# Patient Record
Sex: MALE | Race: WHITE | Employment: OTHER | URBAN - METROPOLITAN AREA
[De-identification: names, ages, dates, MRNs, and addresses within clinical notes are randomized per-mention and may not be internally consistent; named-entity substitution may affect disease eponyms.]

---

## 2020-12-09 ENCOUNTER — HOSPITAL ENCOUNTER (OUTPATIENT)
Dept: PHYSICAL THERAPY | Age: 45
Discharge: HOME | End: 2020-12-09
Payer: OTHER GOVERNMENT

## 2020-12-09 PROCEDURE — 97162 PT EVAL MOD COMPLEX 30 MIN: CPT

## 2020-12-09 PROCEDURE — 97110 THERAPEUTIC EXERCISES: CPT

## 2020-12-09 NOTE — PROGRESS NOTES
[facility] [facility] PHYSICAL THERAPY  [facility]  [address], [address], [address]  Phone: [phone]  Fax: [phone] / STATEMENT OF MEDICAL NECESSITY FOR PHYSICAL THERAPY SERVICES  Patient Name: Torey Vasquez : 1975   Medical   Diagnosis: Neck pain Treatment Diagnosis: Neck pain [M54.2]   Onset Date: 2020     Referral Source: Charlene Farfan DO Start of Care [facility]): 2020   Prior Hospitalization: See medical history Provider #: [id number]   Prior Level of Function: , crossfit, weighted exercise   Comorbidities: arthritis   Medications: Verified on Patient Summary List   The Plan of Care and following information is based on the information from the initial evaluation.   ===========================================================================================  Assessment / key information:  Patient is a 54 y.o. male who presents to [facility] with complaints of L sided neck pain with associated L shoulder pain and occasional tingling in fingers, denies HA. Patient was a [employer]  for 20+ years and reports heavy helmet aided in production of poor posture. Patient reports onset of neck stiffness a year ago, and that pain has gotten worse in the past few months. Patient has long history of L shoulder pain for over 7 years, recently received a cortisone shot and stated that it helped reduce pain and numbness in hand. Patient reports neck pain is exacerbated with sitting long periods, poor sleep and driving, and relieved with a good night of sleep, and tylenol. Pt reports 3-4/10 pain currently, 2-3/10 pain at rest and 5/10 pain at worst. Patient presents with increased tone to B UT L>R , FHRS, L shoulder elevation, decreased mobility of BL 1st rib L>R and hypomobile cervical PIVM.     Patient presents with the following ROM and MMT: thoracic ROM 50% limited, cervical AROM: R rot 45 degrees, L rot 50 degrees p!, B SB 35 degrees. Deep neck flexor endurance 12 secs before chin lift. BL upper extremity strength 5-/5 with no exacerbations in neck pain. Rhomboids 3+/5, mid traps 3+/5. Physical Therapy services will be beneficial in order to decrease pain and tone, improve deep cervical flexor and scapular stabilizer strength, and improve cervicothoracic ROM to improve resting posture.   ===========================================================================================  Eval Complexity: History MEDIUM  Complexity : 1-2 comorbidities / personal factors will impact the outcome/ POC ;  Examination  MEDIUM Complexity : 3 Standardized tests and measures addressing body structure, function, activity limitation and / or participation in recreation ; Presentation MEDIUM Complexity : Evolving with changing characteristics ; Decision Making MEDIUM Complexity : FOTO score of 26-74; Overall Complexity MEDIUM  Problem List: pain affecting function, decrease ROM, decrease strength, edema affecting function, impaired gait/ balance and decrease ADL/ functional abilitiies   Treatment Plan may include any combination of the following: Therapeutic exercise, Therapeutic activities, Neuromuscular re-education, Physical agent/modality, Gait/balance training, Manual therapy, Patient education, Self Care training and Functional mobility training  Patient / Family readiness to learn indicated by: asking questions, trying to perform skills and interest  Persons(s) to be included in education: patient (P)  Barriers to Learning/Limitations: None  Measures taken, if barriers to learning:    Patient Goal (s): \"Get range of motion back and prevent further degrade\"   Patient self reported health status: excellent  Rehabilitation Potential: good   Short Term Goals: To be accomplished in  3  weeks:  1. Patient will be independent and compliant with initial HEP.   2. Patient will report decreased pain levels to 2/10 in order to sleep through the night pain free. 3. Improve BL SB ROM to 45 degrees in order to demonstrate decreased tone in Upper traps and decrease reports of stiffness. 4. Demonstrate improved scapular stabilizer strength to 4/5 to improve patients ability to maintain improved posture for prolonged periods.  Long Term Goals: To be accomplished in  6  weeks:  1. Patient will report decreased pain levels to 0/10 in order to resume work as a  without exacerbations in pain. 2. Improve FOTO score from 40 to > or = 63 in order to improve ability to perform ADLs without increased pain. 3. Demonstrate improved scapular stabilizer strength to 4+/5 to improve patients ability to return to weighted exercise program.  4. Demonstrate 75%  Improvement cervicothoracic ROM in order to maintain improved posture for prolonged periods. Frequency / Duration:   Patient to be seen  2  times per week for 6  weeks:  Patient / Caregiver education and instruction: activity modification and exercises  Therapist Signature: Chapito Humphrey DPT Date: 91/8/6543   Certification Period: NA Time: 2:03 PM   ===========================================================================================  I certify that the above Physical Therapy Services are being furnished while the patient is under my care. I agree with the treatment plan and certify that this therapy is necessary. Physician Signature:        Date:       Time:     Please sign and return to [facility] or you may fax the signed copy to [phone]. Thank you.

## 2020-12-10 NOTE — PROGRESS NOTES
PHYSICAL THERAPY - DAILY TREATMENT NOTE    Patient Name: Brando Horowitz        Date: 2020  : 1975   YES Patient  Verified  Visit #:     Insurance: Payor: CHRIS / Plan: Jer Melara 73 / Product Type:  /      In time: 200p Out time: 250p   Total Treatment Time: 50     BCBS/Medicare Time Tracking (below)   Total Timed Codes (min):  NA 1:1 Treatment Time:  NA     TREATMENT AREA =  Neck pain [M54.2]    SUBJECTIVE  Pain Level (on 0 to 10 scale):  3-4  / 10   Medication Changes/New allergies or changes in medical history, any new surgeries or procedures? NO    If yes, update Summary List   Subjective Functional Status/Changes:  []  No changes reported     Approximately 3 mo of L sided Neck pain and stiffness           Modalities Rationale:     decrease edema, decrease inflammation, decrease pain and increase tissue extensibility to improve patient's ability to perform pain-free ADLs. min [] Estim, type/location:                                      []  att     []  unatt     []  w/US     []  w/ice    []  w/heat    min []  Mechanical Traction: type/lbs                   []  pro   []  sup   []  int   []  cont    []  before manual    []  after manual    min []  Ultrasound, settings/location:      min []  Iontophoresis w/ dexamethasone, location:                                               []  take home patch       []  in clinic   10 min []  Ice     [x]  Heat    location/position:  To cervical spine in seated    min []  Vasopneumatic Device, press/temp:     min []  Other:    [x] Skin assessment post-treatment (if applicable):    [x]  intact    [x]  redness- no adverse reaction     []redness  adverse reaction:        15 min Therapeutic Exercise:  [x]  See flow sheet   Rationale:      increase ROM, increase strength and improve coordination to improve the patients ability to maintain improved posture       Billed With/As:   [x] TE   [] TA   [] Neuro   [] Self Care Patient Education: [x] Review HEP    [] Progressed/Changed HEP based on:   [] positioning   [] body mechanics   [] transfers   [] heat/ice application    [] other:      Other Objective/Functional Measures:    See POC for objective measures     Post Treatment Pain Level (on 0 to 10) scale:   2  / 10     ASSESSMENT  Assessment/Changes in Function:     See POC      [x]  See Progress Note/Recertification   Patient will continue to benefit from skilled PT services to modify and progress therapeutic interventions, address functional mobility deficits, address ROM deficits, address strength deficits, analyze and address soft tissue restrictions, analyze and cue movement patterns and analyze and modify body mechanics/ergonomics to attain remaining goals. Progress toward goals / Updated goals:    See POC for new short and long term goals.       PLAN  [x]  Upgrade activities as tolerated YES Continue plan of care   []  Discharge due to :    []  Other:      Therapist: Lindsey Ruiz DPT    Date: 12/9/2020 Time: 7:20 PM     Future Appointments   Date Time Provider Shirlene Gómez   12/16/2020  8:45 AM Lyudmila Mayer [facility]   12/18/2020  9:00 AM Bruce Sandoval, PT [facility]   12/22/2020  1:15 PM Bruce Sandoval, PT [facility]   12/24/2020 12:30 PM Bruce Sandoval PT [facility]   12/28/2020  9:45 AM Darío Bai PT [facility]   12/30/2020  5:00 PM Bruce Sandoval, PT [facility]   1/4/2021 11:45 AM Bruce Sandoval PT [facility]

## 2020-12-16 ENCOUNTER — HOSPITAL ENCOUNTER (OUTPATIENT)
Dept: PHYSICAL THERAPY | Age: 45
Discharge: HOME | End: 2020-12-16
Payer: OTHER GOVERNMENT

## 2020-12-16 PROCEDURE — 97110 THERAPEUTIC EXERCISES: CPT

## 2020-12-16 PROCEDURE — 97140 MANUAL THERAPY 1/> REGIONS: CPT

## 2020-12-16 NOTE — PROGRESS NOTES
PHYSICAL THERAPY - DAILY TREATMENT NOTE    Patient Name: Anette Tellez        Date: 2020  : 1975   YES Patient  Verified  Visit #:   2   of   12  Insurance: Payor: CHRIS / Plan: Jer Melara 73 / Product Type:  /      In time: 845 Out time: 945   Total Treatment Time: 60     BCBS/Medicare Time Tracking (below)   Total Timed Codes (min):  NA 1:1 Treatment Time:  NA     TREATMENT AREA =  Neck pain [M54.2]    SUBJECTIVE  Pain Level (on 0 to 10 scale):  2  / 10   Medication Changes/New allergies or changes in medical history, any new surgeries or procedures? NO    If yes, update Summary List   Subjective Functional Status/Changes:  []  No changes reported     My neck bothers me most at night in the evening. Modalities Rationale:     decrease pain to improve patient's ability to perform pain free ADLs1   min [] Estim, type/location:                                      []  att     []  unatt     []  w/US     []  w/ice    []  w/heat    min []  Mechanical Traction: type/lbs                   []  pro   []  sup   []  int   []  cont    []  before manual    []  after manual    min []  Ultrasound, settings/location:      min []  Iontophoresis w/ dexamethasone, location:                                               []  take home patch       []  in clinic   10 min []  Ice     [x]  Heat    location/position: MHP to c/s in supine     min []  Vasopneumatic Device, press/temp:     min []  Other:    [x] Skin assessment post-treatment (if applicable):    [x]  intact    [x]  redness- no adverse reaction     []redness  adverse reaction:        40 min Therapeutic Exercise:  [x]  See flow sheet   Rationale:      increase ROM, increase strength, improve coordination, improve balance and increase proprioception to improve the patients ability to perform unlimited ADLs      10 min Manual Therapy: Manual UT stretch, STM and DTM to L UT, L c/s paraspinals and SOR.    Rationale:      decrease pain, increase ROM and increase tissue extensibility to improve patient's ability to perform pain free ADLs   The manual therapy interventions were performed at a separate and distinct time from the therapeutic activities interventions. Billed With/As:   [] TE   [] TA   [] Neuro   [] Self Care Patient Education: [x] Review HEP    [] Progressed/Changed HEP based on:   [] positioning   [] body mechanics   [] transfers   [] heat/ice application    [] other:      Other Objective/Functional Measures:    Initiated exercises per flow sheet      Post Treatment Pain Level (on 0 to 10) scale:   0  / 10     ASSESSMENT  Assessment/Changes in Function:     Quick to fatigue with all parascapular muscular strengthening exercises. []  See Progress Note/Recertification   Patient will continue to benefit from skilled PT services to modify and progress therapeutic interventions, address functional mobility deficits, address ROM deficits, address strength deficits, analyze and address soft tissue restrictions, analyze and cue movement patterns, analyze and modify body mechanics/ergonomics, assess and modify postural abnormalities, address imbalance/dizziness and instruct in home and community integration to attain remaining goals. Progress toward goals / Updated goals:    Progressing towards LTG 2.      PLAN  [x]  Upgrade activities as tolerated YES Continue plan of care   []  Discharge due to :    []  Other:      Therapist: Salima Jarvis    Date: 12/16/2020 Time: 11:14 AM     Future Appointments   Date Time Provider Shirlene Gómez   12/18/2020  9:00 AM Rita Betts PT [facility]   12/22/2020  1:15 PM Rita Betts PT [facility]   12/24/2020 12:30 PM Rita Betts PT [facility]   12/28/2020  9:45 AM IRMA Zelaya [facility]   12/30/2020  5:00 PM Rita Betts PT [facility]   1/4/2021 11:45 AM IRMA Jameson [facility]

## 2020-12-18 ENCOUNTER — HOSPITAL ENCOUNTER (OUTPATIENT)
Dept: PHYSICAL THERAPY | Age: 45
Discharge: HOME | End: 2020-12-18
Payer: OTHER GOVERNMENT

## 2020-12-18 PROCEDURE — 97140 MANUAL THERAPY 1/> REGIONS: CPT

## 2020-12-18 PROCEDURE — 97110 THERAPEUTIC EXERCISES: CPT

## 2020-12-18 NOTE — PROGRESS NOTES
PHYSICAL THERAPY - DAILY TREATMENT NOTE    Patient Name: Jess Bonilla        Date: 2020  : 1975   YES Patient  Verified  Visit #:   3   of   12  Insurance: Payor: CHRIS / Plan: Jer Melara 73 / Product Type:  /      In time: 900a Out time: 955a   Total Treatment Time: 55     BCBS/Medicare Time Tracking (below)   Total Timed Codes (min):  NA 1:1 Treatment Time:  NA     TREATMENT AREA =  Neck pain [M54.2]    SUBJECTIVE  Pain Level (on 0 to 10 scale):  0  / 10   Medication Changes/New allergies or changes in medical history, any new surgeries or procedures? NO    If yes, update Summary List   Subjective Functional Status/Changes:  []  No changes reported     I feel good today, im still stiff but no pain today. Modalities Rationale:     decrease edema, decrease inflammation and decrease pain to improve patient's ability to perform pain-free ADLs.     min [] Estim, type/location:                                      []  att     []  unatt     []  w/US     []  w/ice    []  w/heat    min []  Mechanical Traction: type/lbs                   []  pro   []  sup   []  int   []  cont    []  before manual    []  after manual    min []  Ultrasound, settings/location:      min []  Iontophoresis w/ dexamethasone, location:                                               []  take home patch       []  in clinic   10 min []  Ice     [x]  Heat    location/position: Cervical spine in supine    min []  Vasopneumatic Device, press/temp:     min []  Other:    [x] Skin assessment post-treatment (if applicable):    [x]  intact    [x]  redness- no adverse reaction     []redness  adverse reaction:        35 min Therapeutic Exercise:  [x]  See flow sheet   Rationale:      increase ROM, increase strength and improve coordination to improve the patients ability to improve resting posture     10 min Manual Therapy: Manual UT stretch, STM and DTM to L UT, L c/s paraspinals and SOR, Grade IV first rib mobilizations   Rationale:      decrease pain, increase ROM, increase tissue extensibility and decrease edema  to improve patient's ability to maintain  The manual therapy interventions were performed at a separate and distinct time from the therapeutic activities interventions. Billed With/As:   [x] TE   [] TA   [] Neuro   [] Self Care Patient Education: [x] Review HEP    [] Progressed/Changed HEP based on:   [] positioning   [] body mechanics   [] transfers   [] heat/ice application    [] other:      Other Objective/Functional Measures: Added thoracic extension in supine   Post Treatment Pain Level (on 0 to 10) scale:   0  / 10     ASSESSMENT  Assessment/Changes in Function:     Patient shows improvements with scapular control but continues to require manual cues to prevent UT engagement     []  See Progress Note/Recertification   Patient will continue to benefit from skilled PT services to modify and progress therapeutic interventions, address functional mobility deficits, address ROM deficits, address strength deficits, analyze and address soft tissue restrictions, analyze and cue movement patterns, analyze and modify body mechanics/ergonomics and assess and modify postural abnormalities to attain remaining goals. Progress toward goals / Updated goals:    Progressing towards STG #3.      PLAN  [x]  Upgrade activities as tolerated YES Continue plan of care   []  Discharge due to :    []  Other:      Therapist: Loida Willoughby DPT    Date: 12/18/2020 Time: 9:46 AM     Future Appointments   Date Time Provider Shirlene Gómez   12/22/2020  1:15 PM Toni Minor PT [facility]   12/24/2020 12:30 PM Toni Minor PT [facility]   12/28/2020  9:45 AM Marlene Corea PT [facility]   12/30/2020  5:00 PM Toni Minor PT [facility]   1/4/2021 11:45 AM IRMA Pritchard [facility]

## 2020-12-22 ENCOUNTER — HOSPITAL ENCOUNTER (OUTPATIENT)
Dept: PHYSICAL THERAPY | Age: 45
Discharge: HOME | End: 2020-12-22
Payer: OTHER GOVERNMENT

## 2020-12-22 PROCEDURE — 97014 ELECTRIC STIMULATION THERAPY: CPT

## 2020-12-22 PROCEDURE — 97140 MANUAL THERAPY 1/> REGIONS: CPT

## 2020-12-22 PROCEDURE — 97110 THERAPEUTIC EXERCISES: CPT

## 2020-12-22 NOTE — PROGRESS NOTES
PHYSICAL THERAPY - DAILY TREATMENT NOTE    Patient Name: Gorge King        Date: 2020  : 1975   YES Patient  Verified  Visit #:   4   of   12  Insurance: Payor: CHRIS / Plan: Jer Melara 73 / Product Type:  /      In time: 115p Out time: 215p   Total Treatment Time: 55     BCBS/Medicare Time Tracking (below)   Total Timed Codes (min):  NA 1:1 Treatment Time:  NA     TREATMENT AREA =  Neck pain [M54.2]    SUBJECTIVE  Pain Level (on 0 to 10 scale):  0  / 10   Medication Changes/New allergies or changes in medical history, any new surgeries or procedures? NO    If yes, update Summary List   Subjective Functional Status/Changes:  []  No changes reported     I was a little sore after last session but Im feeling better           Modalities Rationale:     decrease edema, decrease inflammation, decrease pain and increase tissue extensibility to improve patient's ability to perform pain-free ADLs.    15 min [x] Estim, type/location:                                      []  att     [x]  unatt     []  w/US     []  w/ice    [x]  w/heat    min []  Mechanical Traction: type/lbs                   []  pro   []  sup   []  int   []  cont    []  before manual    []  after manual    min []  Ultrasound, settings/location:      min []  Iontophoresis w/ dexamethasone, location:                                               []  take home patch       []  in clinic    min []  Ice     []  Heat    location/position:     min []  Vasopneumatic Device, press/temp:     min []  Other:    [] Skin assessment post-treatment (if applicable):    []  intact    []  redness- no adverse reaction     []redness  adverse reaction:        30 min Therapeutic Exercise:  [x]  See flow sheet   Rationale:      increase ROM, increase strength and improve coordination to improve the patients ability to maintain improved posture     10 min Manual Therapy: Manual UT stretch, STM and DTM to L UT, L c/s paraspinals and SOR, Grade IV first rib mobilizations   Rationale:      decrease pain, increase ROM, increase tissue extensibility and decrease edema  to improve patient's ability to resume exercise program without exacerbations in pain  The manual therapy interventions were performed at a separate and distinct time from the therapeutic activities interventions. Billed With/As:   [x] TE   [] TA   [] Neuro   [] Self Care Patient Education: [x] Review HEP    [] Progressed/Changed HEP based on:   [] positioning   [] body mechanics   [] transfers   [] heat/ice application    [] other:      Other Objective/Functional Measures: Added open/close book and banded extensions  Added IFC during heat for hypertension in L upper trap     Post Treatment Pain Level (on 0 to 10) scale:   0  / 10     ASSESSMENT  Assessment/Changes in Function:     Patient shows improved control of scapular stabilizers, and improvements in cervicothoracic ROM. Decreased tension in L UT during manual. Less recruitment of SCM during chin tuck and lift. []  See Progress Note/Recertification   Patient will continue to benefit from skilled PT services to modify and progress therapeutic interventions, address functional mobility deficits, address ROM deficits, address strength deficits, analyze and address soft tissue restrictions, analyze and cue movement patterns, analyze and modify body mechanics/ergonomics and assess and modify postural abnormalities to attain remaining goals.    Progress toward goals / Updated goals:    Progressing towards ROM goals     PLAN  [x]  Upgrade activities as tolerated YES Continue plan of care   []  Discharge due to :    []  Other:      Therapist: Max Gonzales DPT    Date: 12/22/2020 Time: 1:19 PM     Future Appointments   Date Time Provider Shirlene Gómez   12/24/2020 12:30 PM Reema Smalls PT [facility]   12/28/2020  9:45 AM IRMA Hand [facility]   12/30/2020  5:00 PM Reema Smalls PT [facility]   1/4/2021 11:45 AM Clyde Rodriguez, PT [facility]

## 2020-12-24 ENCOUNTER — HOSPITAL ENCOUNTER (OUTPATIENT)
Dept: PHYSICAL THERAPY | Age: 45
Discharge: HOME | End: 2020-12-24
Payer: OTHER GOVERNMENT

## 2020-12-24 PROCEDURE — 97110 THERAPEUTIC EXERCISES: CPT

## 2020-12-24 PROCEDURE — 97014 ELECTRIC STIMULATION THERAPY: CPT

## 2020-12-24 PROCEDURE — 97140 MANUAL THERAPY 1/> REGIONS: CPT

## 2020-12-24 NOTE — PROGRESS NOTES
PHYSICAL THERAPY - DAILY TREATMENT NOTE    Patient Name: Brando Horowitz        Date: 2020  : 1975   YES Patient  Verified  Visit #:     Insurance: Payor: CHRIS / Plan: Jer Melara 73 / Product Type:  /      In time: 1230p Out time: 130p   Total Treatment Time: 55     BCBS/Medicare Time Tracking (below)   Total Timed Codes (min):  55 1:1 Treatment Time:  55      TREATMENT AREA =  Neck pain [M54.2]    SUBJECTIVE  Pain Level (on 0 to 10 scale):  0  / 10   Medication Changes/New allergies or changes in medical history, any new surgeries or procedures? NO    If yes, update Summary List   Subjective Functional Status/Changes:  []  No changes reported     Main complaint is still stiffness on L side. It used to be uncomfortable all day but now I only notice it at night. Modalities Rationale:     decrease edema, decrease inflammation, decrease pain and increase tissue extensibility to improve patient's ability to perform pain-free ADLs. 15 min [x] Estim, type/location:  To cervical spine in supine                                     []  att     [x]  unatt     []  w/US     []  w/ice    [x]  w/heat    min []  Mechanical Traction: type/lbs                   []  pro   []  sup   []  int   []  cont    []  before manual    []  after manual    min []  Ultrasound, settings/location:      min []  Iontophoresis w/ dexamethasone, location:                                               []  take home patch       []  in clinic    min []  Ice     []  Heat    location/position:     min []  Vasopneumatic Device, press/temp:     min []  Other:    [x] Skin assessment post-treatment (if applicable):    [x]  intact    [x]  redness- no adverse reaction     []redness  adverse reaction:        30 min Therapeutic Exercise:  [x]  See flow sheet   Rationale:      increase ROM, increase strength and improve coordination to improve the patients ability to maintain improved posture     10 min Manual Therapy:    Rationale:      decrease pain, increase ROM, increase tissue extensibility and decrease edema  to improve patient's ability to resume reaching activities without exacerbations in neck pain. The manual therapy interventions were performed at a separate and distinct time from the therapeutic activities interventions. Billed With/As:   [x] TE   [] TA   [] Neuro   [] Self Care Patient Education: [x] Review HEP    [] Progressed/Changed HEP based on:   [] positioning   [] body mechanics   [] transfers   [] heat/ice application    [] other:      Other Objective/Functional Measures:    Thoracic extension over 1/2 FR, horizontal abduction in front of mirror for cues of UT engagement    Cervical AROM: R rot 65 degrees, L rot 70 degrees, R SB 15 degrees, L SB 25 degrees   Post Treatment Pain Level (on 0 to 10) scale:   0  / 10     ASSESSMENT  Assessment/Changes in Function:     Patient shows improvements in cervical and thoracic ROM. Visible increased tension in BL UT L>R, but patient is better able to prevent activation of UT during exercises. []  See Progress Note/Recertification   Patient will continue to benefit from skilled PT services to modify and progress therapeutic interventions, address functional mobility deficits, address ROM deficits, address strength deficits, analyze and address soft tissue restrictions, analyze and cue movement patterns, analyze and modify body mechanics/ergonomics and assess and modify postural abnormalities to attain remaining goals.    Progress toward goals / Updated goals:    Progressing towards ROM goals     PLAN  [x]  Upgrade activities as tolerated YES Continue plan of care   []  Discharge due to :    []  Other:      Therapist: Perla Flores DPT    Date: 12/24/2020 Time: 12:32 PM     Future Appointments   Date Time Provider Shirlene Gómez   12/28/2020  9:45 AM Fatmata Beach, PT [facility]   12/30/2020  5:00 PM Aleja Marte PT [facility] [place] [facility]   1/4/2021 11:45 AM Bright Bonilla, PT [facility]

## 2020-12-28 ENCOUNTER — HOSPITAL ENCOUNTER (OUTPATIENT)
Dept: PHYSICAL THERAPY | Age: 45
Discharge: HOME | End: 2020-12-28
Payer: OTHER GOVERNMENT

## 2020-12-28 PROCEDURE — 97140 MANUAL THERAPY 1/> REGIONS: CPT

## 2020-12-28 PROCEDURE — 97110 THERAPEUTIC EXERCISES: CPT

## 2020-12-28 PROCEDURE — 97014 ELECTRIC STIMULATION THERAPY: CPT

## 2020-12-28 NOTE — PROGRESS NOTES
PHYSICAL THERAPY - DAILY TREATMENT NOTE    Patient Name: Katerina Mclean        Date: 2020  : 1975   YES Patient  Verified  Visit #:     Insurance: Payor: CHRIS / Plan: Jer Melara 73 / Product Type:  /      In time: 950 Out time: 1100   Total Treatment Time: 65     BCBS/Medicare Time Tracking (below)   Total Timed Codes (min):   1:1 Treatment Time:       TREATMENT AREA =  Neck pain [M54.2]    SUBJECTIVE  Pain Level (on 0 to 10 scale):  1-2  / 10   Medication Changes/New allergies or changes in medical history, any new surgeries or procedures? NO    If yes, update Summary List   Subjective Functional Status/Changes:  []  No changes reported     I only have the tingling in my L hand when I'm walking the dog.  The neck pain has been better and I dont shift positions as much when Im sitting because I'm uncomfortable           Modalities Rationale:     decrease inflammation, decrease pain and increase tissue extensibility to improve patient's ability to perform ADLs  15 min [x] Estim, type/location: IFC to C/S in supine                                    []  att     [x]  unatt     []  w/US     []  w/ice    [x]  w/heat    min []  Mechanical Traction: type/lbs                   []  pro   []  sup   []  int   []  cont    []  before manual    []  after manual    min []  Ultrasound, settings/location:      min []  Iontophoresis w/ dexamethasone, location:                                               []  take home patch       []  in clinic    min []  Ice     []  Heat    location/position:     min []  Vasopneumatic Device, press/temp:     min []  Other:    [x] Skin assessment post-treatment (if applicable):    [x]  intact    [x]  redness- no adverse reaction     []redness  adverse reaction:        40 min Therapeutic Exercise:  [x]  See flow sheet   Rationale:      increase ROM and increase strength to improve the patients ability to perform unlimted ADLs     10 min Manual Therapy: Technique:      [] S/DTM []IASTM []PROM  [] Passive Stretching  []manual TPR  [x]Jt manipulation:Gr I [] II []  III [] IV[x] V[]  Treatment/Area:  L 1st rib depression with respiratory assist, mTPR to L UT and scalenes   Rationale:      decrease pain, increase ROM, increase tissue extensibility and decrease trigger points to improve patient's ability to perform ADLs  The manual therapy interventions were performed at a separate and distinct time from the therapeutic activities interventions. Billed With/As:   [] TE   [] TA   [] Neuro   [] Self Care Patient Education: [x] Review HEP    [] Progressed/Changed HEP based on:   [] positioning   [] body mechanics   [] transfers   [] heat/ice application    [] other:      Other Objective/Functional Measures:    TE per FS     Post Treatment Pain Level (on 0 to 10) scale:   0  / 10     ASSESSMENT  Assessment/Changes in Function:     Improved L 1st rib mobility after MT with respiratory A. Improved form during diaphragmatic breathing with repeated cueing     []  See Progress Note/Recertification   Patient will continue to benefit from skilled PT services to modify and progress therapeutic interventions, address functional mobility deficits, address ROM deficits, address strength deficits, analyze and address soft tissue restrictions, analyze and cue movement patterns, analyze and modify body mechanics/ergonomics, assess and modify postural abnormalities, address imbalance/dizziness and instruct in home and community integration to attain remaining goals.    Progress toward goals / Updated goals:    Progressing towards LTG1     PLAN  [x]  Upgrade activities as tolerated YES Continue plan of care   []  Discharge due to :    []  Other:      Therapist: Georgina Cantu, PT, DPT, MTC, CMTPT    Date: 12/28/2020 Time: 2:26 PM     Future Appointments   Date Time Provider Shirlene Gómez   12/30/2020  5:00 PM Tamar Reese, PT [facility]   1/4/2021 11:45 AM Tamar Reese PT [facility]

## 2020-12-30 ENCOUNTER — HOSPITAL ENCOUNTER (OUTPATIENT)
Dept: PHYSICAL THERAPY | Age: 45
Discharge: HOME | End: 2020-12-30
Payer: OTHER GOVERNMENT

## 2020-12-30 PROCEDURE — 97110 THERAPEUTIC EXERCISES: CPT

## 2020-12-30 PROCEDURE — 97140 MANUAL THERAPY 1/> REGIONS: CPT

## 2020-12-30 PROCEDURE — 97014 ELECTRIC STIMULATION THERAPY: CPT

## 2020-12-30 NOTE — PROGRESS NOTES
PHYSICAL THERAPY - DAILY TREATMENT NOTE    Patient Name: Izzy Mak        Date: 2020  : 1975   YES Patient  Verified  Visit #:     Insurance: Payor: CHRIS / Plan: Ruth Lopes / Product Type:  /      In time: 500p Out time: 555p   Total Treatment Time: 55     BCBS/Medicare Time Tracking (below)   Total Timed Codes (min):  NA 1:1 Treatment Time:  NA     TREATMENT AREA =  Neck pain [M54.2]    SUBJECTIVE  Pain Level (on 0 to 10 scale):  1  / 10   Medication Changes/New allergies or changes in medical history, any new surgeries or procedures? NO    If yes, update Summary List   Subjective Functional Status/Changes:  []  No changes reported     I noticed that the tingling in my fingers gets better when I bring my shoulders back and stand tall like what we have talked about before, its just hard to maintain. Modalities Rationale:     decrease edema, decrease inflammation, decrease pain and increase tissue extensibility to improve patient's ability to perform pain-free ADLs.    15 min [x] Estim, type/location: IFC with heat t c/s                                     []  att     [x]  unatt     []  w/US     []  w/ice    [x]  w/heat    min []  Mechanical Traction: type/lbs                   []  pro   []  sup   []  int   []  cont    []  before manual    []  after manual    min []  Ultrasound, settings/location:      min []  Iontophoresis w/ dexamethasone, location:                                               []  take home patch       []  in clinic    min []  Ice     []  Heat    location/position:     min []  Vasopneumatic Device, press/temp:     min []  Other:    [x] Skin assessment post-treatment (if applicable):    [x]  intact    [x]  redness- no adverse reaction     []redness  adverse reaction:        30 min Therapeutic Exercise:  [x]  See flow sheet   Rationale:      increase ROM, increase strength, improve coordination and improve balance to improve the patients ability to resume reaching activities     10 min Manual Therapy: SOR, manual c/s stretches, first rib mobilization with diaphragmatic breathing   Rationale:      decrease pain, increase ROM, increase tissue extensibility and decrease edema  to improve patient's ability to maintain improved posture  The manual therapy interventions were performed at a separate and distinct time from the therapeutic activities interventions. Billed With/As:   [x] TE   [] TA   [] Neuro   [] Self Care Patient Education: [x] Review HEP    [] Progressed/Changed HEP based on:   [] positioning   [] body mechanics   [] transfers   [] heat/ice application    [] other:      Other Objective/Functional Measures: Added prone retraction and extension, and dowel flexion to disengage upper traps     Post Treatment Pain Level (on 0 to 10) scale:   0  / 10     ASSESSMENT  Assessment/Changes in Function:     Continues to show improvements in posture and ability to maintain for prolonged periods. Scapular strength is steadily improving and patient does not engage UT as often. Educated to resume home exercise program that he was doing before evaluation and note on any changes in symptoms. []  See Progress Note/Recertification   Patient will continue to benefit from skilled PT services to modify and progress therapeutic interventions, address functional mobility deficits, address ROM deficits, address strength deficits, analyze and address soft tissue restrictions and analyze and cue movement patterns to attain remaining goals.    Progress toward goals / Updated goals:    Progressing towards ROM goals     PLAN  [x]  Upgrade activities as tolerated YES Continue plan of care   []  Discharge due to :    []  Other:      Therapist: Garret Jansen DPT    Date: 12/30/2020 Time: 6:30 PM     Future Appointments   Date Time Provider Shirlene Gómez   1/4/2021 11:45 AM Mariel Castillo PT [facility] [place] [facility]   1/6/2021  1:15 PM Mariel Castillo PT [facility]   1/12/2021 12:30 PM Jordi Reyes, IRMA [facility] [place] [facility]   1/14/2021  1:15 PM Jordi Reeys, PT [facility]

## 2021-01-04 ENCOUNTER — HOSPITAL ENCOUNTER (OUTPATIENT)
Dept: PHYSICAL THERAPY | Age: 46
Discharge: HOME | End: 2021-01-04
Payer: OTHER GOVERNMENT

## 2021-01-04 PROCEDURE — 97014 ELECTRIC STIMULATION THERAPY: CPT

## 2021-01-04 PROCEDURE — 97140 MANUAL THERAPY 1/> REGIONS: CPT

## 2021-01-04 PROCEDURE — 97110 THERAPEUTIC EXERCISES: CPT

## 2021-01-04 NOTE — PROGRESS NOTES
PHYSICAL THERAPY - DAILY TREATMENT NOTE    Patient Name: Dereje Loyd        Date: 2021  : 1975   YES Patient  Verified  Visit #:     Insurance: Payor:  / Plan: Jer Melara 74 / Product Type:  /      In time: 5743H Out time: 9583A   Total Treatment Time: 55     BCBS/Medicare Time Tracking (below)   Total Timed Codes (min):  NA 1:1 Treatment Time:  NA     TREATMENT AREA =  Neck pain [M54.2]    SUBJECTIVE  Pain Level (on 0 to 10 scale):  0  / 10   Medication Changes/New allergies or changes in medical history, any new surgeries or procedures? NO    If yes, update Summary List   Subjective Functional Status/Changes:  []  No changes reported     I can tell im steadily improving, im still stiff but its better each day     Tingles come on every now and then on the L when I am tired          Modalities Rationale:     decrease edema, decrease inflammation, decrease pain and increase tissue extensibility to improve patient's ability to perform pain-free ADLs.    15 min [x] Estim, type/location: IFC to c/s with heat in supine                                     []  att     [x]  unatt     []  w/US     []  w/ice    [x]  w/heat    min []  Mechanical Traction: type/lbs                   []  pro   []  sup   []  int   []  cont    []  before manual    []  after manual    min []  Ultrasound, settings/location:      min []  Iontophoresis w/ dexamethasone, location:                                               []  take home patch       []  in clinic    min []  Ice     []  Heat    location/position:     min []  Vasopneumatic Device, press/temp:     min []  Other:    [x] Skin assessment post-treatment (if applicable):    [x]  intact    [x]  redness- no adverse reaction     []redness  adverse reaction:        30 min Therapeutic Exercise:  [x]  See flow sheet   Rationale:      increase ROM, increase strength and improve coordination to improve the patients ability to maintain improved posture without exacerbations in pain. 10 min Manual Therapy: SOR, manual stretches, 1st rib depression with and without diaphragmatic breathing   Rationale:      decrease pain, increase ROM, increase tissue extensibility, decrease edema  and correct positional vertigo to improve patient's ability to maintain improved posture  The manual therapy interventions were performed at a separate and distinct time from the therapeutic activities interventions. Billed With/As:   [x] TE   [] TA   [] Neuro   [] Self Care Patient Education: [x] Review HEP    [] Progressed/Changed HEP based on:   [] positioning   [] body mechanics   [] transfers   [] heat/ice application    [] other:      Other Objective/Functional Measures: Added self-STM with theracane and Prone ITYs on plinth     Post Treatment Pain Level (on 0 to 10) scale:   0  / 10     ASSESSMENT  Assessment/Changes in Function:     Patient continues to show improvement in thoracic and cervical ROM and improved 1st rib mobility R>L. L UT continues to show increased tone but patient is better able to perform exercises without UT engagement. No tingling or numbness noted throughout session. []  See Progress Note/Recertification   Patient will continue to benefit from skilled PT services to modify and progress therapeutic interventions, address functional mobility deficits, address ROM deficits, address strength deficits, analyze and address soft tissue restrictions, analyze and cue movement patterns, analyze and modify body mechanics/ergonomics and assess and modify postural abnormalities to attain remaining goals. Progress toward goals / Updated goals:    Progressing towrads LTG #3.       PLAN  [x]  Upgrade activities as tolerated YES Continue plan of care   []  Discharge due to :    []  Other:      Therapist: Maddy Delatorre DPT    Date: 1/4/2021 Time: 12:09 PM     Future Appointments   Date Time Provider Shirlene Gómez   1/6/2021  1:15 PM Jose Garcia, PT [facility] [place] [facility]   1/12/2021 12:30 PM Jose Garcia, PT [facility]   1/14/2021  1:15 PM [facility]

## 2021-01-06 ENCOUNTER — HOSPITAL ENCOUNTER (OUTPATIENT)
Dept: PHYSICAL THERAPY | Age: 46
Discharge: HOME | End: 2021-01-06
Payer: OTHER GOVERNMENT

## 2021-01-06 PROCEDURE — 97110 THERAPEUTIC EXERCISES: CPT

## 2021-01-06 PROCEDURE — 97014 ELECTRIC STIMULATION THERAPY: CPT

## 2021-01-06 NOTE — PROGRESS NOTES
PHYSICAL THERAPY - DAILY TREATMENT NOTE    Patient Name: Cyn Whitley        Date: 2021  : 1975   YES Patient  Verified  Visit #:     Insurance: Payor:  / Plan: Jer Melara 74 / Product Type:  /      In time: 120p Out time: 215p   Total Treatment Time: 55     BCBS/Medicare Time Tracking (below)   Total Timed Codes (min):  Na 1:1 Treatment Time:  Na     TREATMENT AREA =  Neck pain [M54.2]    SUBJECTIVE  Pain Level (on 0 to 10 scale):  0  / 10   Medication Changes/New allergies or changes in medical history, any new surgeries or procedures? NO    If yes, update Summary List   Subjective Functional Status/Changes:  []  No changes reported     My muscles are a little sore from last time, still no pain but just remaining stiffness     I am going back to work as a  on       Modalities Rationale:     decrease edema, decrease inflammation, decrease pain and increase tissue extensibility to improve patient's ability to perform pain-free ADLs.    15 min [x] Estim, type/location: IFC to c/s with heat to c/s and t/s in supine                                     []  att     [x]  unatt     []  w/US     []  w/ice    [x]  w/heat    min []  Mechanical Traction: type/lbs                   []  pro   []  sup   []  int   []  cont    []  before manual    []  after manual    min []  Ultrasound, settings/location:      min []  Iontophoresis w/ dexamethasone, location:                                               []  take home patch       []  in clinic    min []  Ice     []  Heat    location/position:     min []  Vasopneumatic Device, press/temp:     min []  Other:    [x] Skin assessment post-treatment (if applicable):    [x]  intact    [x]  redness- no adverse reaction     []redness  adverse reaction:        40 min Therapeutic Exercise:  [x]  See flow sheet   Rationale:      increase ROM, increase strength and improve coordination to improve the patients ability to resume reaching activities without pain. Billed With/As:   [x] TE   [] TA   [] Neuro   [] Self Care Patient Education: [x] Review HEP    [] Progressed/Changed HEP based on:   [] positioning   [] body mechanics   [] transfers   [] heat/ice application    [] other:      Other Objective/Functional Measures: Added cat cows, chin tuck against wall and1# weight to I and T in prone     Post Treatment Pain Level (on 0 to 10) scale:   0  / 10     ASSESSMENT  Assessment/Changes in Function:     Continues to show improvements with scapular control, but requires cues to maintain optimal cervical spine alignment throughout exercises. Improved cervical ROM. []  See Progress Note/Recertification   Patient will continue to benefit from skilled PT services to modify and progress therapeutic interventions, address functional mobility deficits, address ROM deficits, address strength deficits, analyze and address soft tissue restrictions, analyze and cue movement patterns, analyze and modify body mechanics/ergonomics and assess and modify postural abnormalities to attain remaining goals. Progress toward goals / Updated goals:    Progressing towards LTG #3.      PLAN  [x]  Upgrade activities as tolerated YES Continue plan of care   []  Discharge due to :    []  Other:      Therapist: Kristy Pagan DPT    Date: 1/6/2021 Time: 1:23 PM     Future Appointments   Date Time Provider Shirlene Gómez   1/12/2021 12:30 PM Tennille Burt PT [facility] [place] [facility]   1/14/2021  1:15 PM Tennille Burt PT [facility]

## 2021-01-06 NOTE — PROGRESS NOTES
Request for use of Dry Needling/Intramuscular Manual Therapy  Patient: Rupert Castro     Referral Source: Rodolfo Gomez DO  Diagnosis: Neck pain [M54.2]    : 1975  Date of initial visit: 2020   Attended visits: 9  Missed Visits: 0    Based on findings from the physical therapy examination and evaluation, the evaluating therapist believes the patient, Rupert Castro  would benefit from including Dry Needling as part of the plan of care. Dry needling is a treatment technique utilized in conjunction with other PT interventions to inactivate myofascial trigger points and the pain and dysfunction they cause. Dry Needling is an advanced procedure that requires additional training of intensive course work. PROCEDURE:   Solid filament sterile needle (typically 0.3mm/30 gauge) inserted into a trigger point   Repeated movements inactivate the trigger points, taking 30-60 seconds per site   Typically consists of 1 dry needling session per week and a possible second treatment including muscle re-education, flexibility, strengthening and other manual techniques to facilitate the benefits of dry needling     BENEFITS:   Inactivation of trigger points   Decreased pain   Increased muscle length   Improved movement patterns   Restoration of function POTENTIAL RISKS:   Post-needling soreness   Infection   Bruising/bleeding   Penetration of a nerve   Pneumothorax   All treating PTs have been thoroughly educated in avoiding adverse reactions    If you agree with this recommendation, please sign this form and fax it to us at [phone]. If you have questions or concerns regarding dry needling or any other treatment we may be providing, please contact us at [phone]    Thank you for allowing us to assist in the care of your patient.   Steffanie Curiel, PT    2021 1:24 PM     NOTE TO PHYSICIAN:  PLEASE COMPLETE THE ORDERS BELOW AND   FAX TO [facility]: [phone]  If you are unable to process this request in 24 hours please contact our office:   [phone] I have read the above request and AGREE to the recommendation of including dry needling as part of the plan of care. ? I have read the above request and DO NOT AGREE to including dry needling as part of the plan of care.   ? I have read the above report and request that my patient continue therapy with the following changes/special instructions:  ________________________________________________________________________    Physicians signature: _______________________________________________     Date: ______________Time:_______________

## 2021-01-12 ENCOUNTER — HOSPITAL ENCOUNTER (OUTPATIENT)
Dept: PHYSICAL THERAPY | Age: 46
Discharge: HOME | End: 2021-01-12
Payer: OTHER GOVERNMENT

## 2021-01-12 PROCEDURE — 97014 ELECTRIC STIMULATION THERAPY: CPT

## 2021-01-12 PROCEDURE — 97110 THERAPEUTIC EXERCISES: CPT

## 2021-01-12 PROCEDURE — 97140 MANUAL THERAPY 1/> REGIONS: CPT

## 2021-01-12 NOTE — PROGRESS NOTES
[facility] PHYSICAL THERAPY AT [address] [place] [address]  Storm Aragon \[facility]\""[address], [address], [address]  Phone: [phone]  Fax: [phone]  PROGRESS NOTE  Patient Name: Dustin Owen : 1975   Treatment/Medical Diagnosis: Neck pain [M54.2]   Referral Source: Erin Alston DO     Date of Initial Visit: 2020 Attended Visits: 10 Missed Visits: 0     SUMMARY OF TREATMENT  Strengthening and stretching of cervical spine musculature, ROM of cervical and thoracic spine, strengthening of scapular stabilizers, neuromuscular re-education of deep cervical flexors, stretching of bilateral pecs, postural education, modalities for pain and decreasing hypertonicity, manual therapy to improve mobility    CURRENT STATUS  Mr. Mini Cleary has shown good progress in resting cervical posture while in the clinic and reports decreased instance of neck pain and radicular symptoms to fingers. Reports decreased stiffness in bilateral upper traps and easier time driving and performing ADLs at home. Deep cervical flexor endurance has improved to 30 sec hold without compensations. Shows improved first rib mobility with minimal remaining restriction L>R. See below for more objective measures:    Goal/Measure of Progress Goal Met? 1. Patient will report decreased pain levels to 2/10 in order to sleep through the night pain free. Status at last Eval: 3-4/10 Current Status: 0/10 for 2 weeks yes   2. Improve BL SB ROM to 45 degrees in order to demonstrate decreased tone in Upper traps and decrease reports of stiffness. Status at last Eval: B SB 35 degrees Current Status: B SB 40 degrees Progressing   3. Demonstrate improved scapular stabilizer strength to 4/5 to improve patients ability to maintain improved posture for prolonged periods. Status at last Eval: 3+/5 Current Status: 4/5 yes   4.   Improve FOTO score from 40 to > or = 63 in order to improve ability to perform ADLs without increased pain. Status at last Eval: 40 Current Status: 67 yes     New Goals to be achieved in __2-4__  weeks:  1. Patient will be independent and compliant with progressive HEP in order to prepare for discharge and maintain gains made with therapy. 2.  Demonstrate improved scapular stabilizer strength to 4+/5 to improve patients ability to return to weighted exercise program.   3.  Demonstrate 75%  Improvement cervicothoracic ROM in order to maintain improved posture for prolonged periods   4. Patient will report no increase in pain while maintaining improved posture for 30 minutes in order to return to full time work as a . RECOMMENDATIONS  Continue with skilled PT services for 2-4 more weeks in order to meet above goals and return to full PLOF. If you have any questions/comments please contact us directly at [phone]. Thank you for allowing us to assist in the care of your patient. Therapist Signature: Maddy Delatorre DPT Date: 1/12/2021     Time: 12:52 PM   NOTE TO PHYSICIAN:  PLEASE COMPLETE THE ORDERS BELOW AND FAX TO   [facility] Physical [facility]: [phone]. If you are unable to process this request in 24 hours please contact our office: [phone].    ___ I have read the above report and request that my patient continue as recommended.   ___ I have read the above report and request that my patient continue therapy with the following changes/special instructions:_________________________________________________________   ___ I have read the above report and request that my patient be discharged from therapy.      Physician Signature:        Date:       Time:

## 2021-01-12 NOTE — PROGRESS NOTES
PHYSICAL THERAPY - DAILY TREATMENT NOTE    Patient Name: Terese Beverly        Date: 2021  : 1975   YES Patient  Verified  Visit #:   10   of   12  Insurance: Payor: CHRIS / Plan: Jer Melara 73 / Product Type:  /      In time: 1240p Out time: 135p   Total Treatment Time: 55     BCBS/Medicare Time Tracking (below)   Total Timed Codes (min):  NA 1:1 Treatment Time:  NA     TREATMENT AREA =  Neck pain [M54.2]    SUBJECTIVE  Pain Level (on 0 to 10 scale):  0  / 10   Medication Changes/New allergies or changes in medical history, any new surgeries or procedures? NO    If yes, update Summary List   Subjective Functional Status/Changes:  []  No changes reported     I feel good, I can tel li am getting better           Modalities Rationale:     decrease edema, decrease inflammation, decrease pain and increase tissue extensibility to improve patient's ability to perform pain-free ADLs.    15 min [x] Estim, type/location: IFC to c/s with heat in supine                                     []  att     [x]  unatt     []  w/US     []  w/ice    [x]  w/heat    min []  Mechanical Traction: type/lbs                   []  pro   []  sup   []  int   []  cont    []  before manual    []  after manual    min []  Ultrasound, settings/location:      min []  Iontophoresis w/ dexamethasone, location:                                               []  take home patch       []  in clinic    min []  Ice     []  Heat    location/position:     min []  Vasopneumatic Device, press/temp:     min []  Other:    [x] Skin assessment post-treatment (if applicable):    [x]  intact    [x]  redness- no adverse reaction     []redness  adverse reaction:        30 min Therapeutic Exercise:  [x]  See flow sheet   Rationale:      increase ROM, increase strength and improve coordination to improve the patients ability to resume overhead activities without exacerbations in pain     10 min Manual Therapy: Upper trap stretches, stretch to pec minor and major with glenohumeral grade IV posterior mobilizations. Rationale:      decrease pain, increase ROM, increase tissue extensibility and decrease edema  to improve patient's ability to maintain improved seated posture. The manual therapy interventions were performed at a separate and distinct time from the therapeutic activities interventions. Billed With/As:   [x] TE   [] TA   [] Neuro   [] Self Care Patient Education: [x] Review HEP    [] Progressed/Changed HEP based on:   [] positioning   [] body mechanics   [] transfers   [] heat/ice application    [] other:      Other Objective/Functional Measures:    See PN     Post Treatment Pain Level (on 0 to 10) scale:   0  / 10     ASSESSMENT  Assessment/Changes in Function:     See PN     [x]  See Progress Note/Recertification   Patient will continue to benefit from skilled PT services to modify and progress therapeutic interventions, address functional mobility deficits, address ROM deficits, address strength deficits, analyze and address soft tissue restrictions and analyze and cue movement patterns to attain remaining goals.    Progress toward goals / Updated goals:    See PN for new updated goals     PLAN  [x]  Upgrade activities as tolerated YES Continue plan of care   []  Discharge due to :    []  Other:      Therapist: Steffanie Curiel DPT    Date: 1/12/2021 Time: 3:31 PM     Future Appointments   Date Time Provider Shirlene Gómez   1/14/2021  1:15 PM Denzil Mcburney, PT [facility]

## 2021-01-14 ENCOUNTER — HOSPITAL ENCOUNTER (OUTPATIENT)
Dept: PHYSICAL THERAPY | Age: 46
Discharge: HOME | End: 2021-01-14
Payer: OTHER GOVERNMENT

## 2021-01-14 PROCEDURE — 97110 THERAPEUTIC EXERCISES: CPT

## 2021-01-14 PROCEDURE — 97014 ELECTRIC STIMULATION THERAPY: CPT

## 2021-01-14 PROCEDURE — 97140 MANUAL THERAPY 1/> REGIONS: CPT

## 2021-01-14 NOTE — PROGRESS NOTES
PHYSICAL THERAPY - DAILY TREATMENT NOTE    Patient Name: Italia Powell        Date: 2021  : 1975   YES Patient  Verified  Visit #:   10   of   12  Insurance: Payor: CHRIS / Plan: Monet Jointer / Product Type:  /      In time: 120p Out time: 215p   Total Treatment Time: 55     BCBS/Medicare Time Tracking (below)   Total Timed Codes (min):  NA 1:1 Treatment Time:  NA     TREATMENT AREA =  Neck pain [M54.2]    SUBJECTIVE  Pain Level (on 0 to 10 scale):  0  / 10   Medication Changes/New allergies or changes in medical history, any new surgeries or procedures? NO    If yes, update Summary List   Subjective Functional Status/Changes:  []  No changes reported     \"Feeling great, no soreness or pain. Usually just feel stiff at night. \"           Modalities Rationale:     decrease edema, decrease inflammation and decrease pain to improve patient's ability to perform pain-free ADLs. 10 min [x] Estim, type/location:                                      []  att     [x]  unatt     []  w/US     []  w/ice    [x]  w/heat    min []  Mechanical Traction: type/lbs                   []  pro   []  sup   []  int   []  cont    []  before manual    []  after manual    min []  Ultrasound, settings/location:      min []  Iontophoresis w/ dexamethasone, location:                                               []  take home patch       []  in clinic    min []  Ice     []  Heat    location/position:     min []  Vasopneumatic Device, press/temp:     min []  Other:    [x] Skin assessment post-treatment (if applicable):    [x]  intact    [x]  redness- no adverse reaction     []redness  adverse reaction:        30 min Therapeutic Exercise:  [x]  See flow sheet   Rationale:      increase ROM, increase strength and improve coordination to improve the patients ability to resume overhead activities without increase in pain.     15 min Manual Therapy: Upper trap stretches, AO flexion, SOR, STM bilateral UT and pec major/minor, grade IV PA mobilizations GHJ, grade IV inferior mobilizations first rib   Rationale:      decrease pain, increase ROM, increase tissue extensibility and decrease edema  to improve patient's ability to maintain improved posture. The manual therapy interventions were performed at a separate and distinct time from the therapeutic activities interventions. Billed With/As:   [x] TE   [] TA   [] Neuro   [] Self Care Patient Education: [x] Review HEP    [] Progressed/Changed HEP based on:   [] positioning   [] body mechanics   [] transfers   [] heat/ice application    [] other:      Other Objective/Functional Measures:    T/s extension on peanut, rows and extensions on swiss ball     Post Treatment Pain Level (on 0 to 10) scale:   0  / 10     ASSESSMENT  Assessment/Changes in Function:     Focus of today's treatment on postural education in seated position- patient returning to work as  in 4 weeks and will require 6+ hours of sitting position. Educated on use of towel role and core contraction to build correct posture starting in the lumbar spine. []  See Progress Note/Recertification   Patient will continue to benefit from skilled PT services to modify and progress therapeutic interventions, address functional mobility deficits, address ROM deficits, address strength deficits, analyze and address soft tissue restrictions, analyze and cue movement patterns, analyze and modify body mechanics/ergonomics and assess and modify postural abnormalities to attain remaining goals. Progress toward goals / Updated goals:    Progressing towards postural goals     PLAN  [x]  Upgrade activities as tolerated YES Continue plan of care   []  Discharge due to :    []  Other:      Therapist: Carlos Reddy DPT    Date: 1/14/2021 Time: 1:40 PM     No future appointments.

## 2021-01-25 ENCOUNTER — HOSPITAL ENCOUNTER (OUTPATIENT)
Dept: PHYSICAL THERAPY | Age: 46
Discharge: HOME | End: 2021-01-25
Payer: OTHER GOVERNMENT

## 2021-01-25 PROCEDURE — 97014 ELECTRIC STIMULATION THERAPY: CPT

## 2021-01-25 PROCEDURE — 97140 MANUAL THERAPY 1/> REGIONS: CPT

## 2021-01-25 PROCEDURE — 97110 THERAPEUTIC EXERCISES: CPT

## 2021-01-25 NOTE — PROGRESS NOTES
PHYSICAL THERAPY - DAILY TREATMENT NOTE    Patient Name: West Rocha        Date: 2021  : 1975   YES Patient  Verified  Visit #:     Insurance: Payor:  / Plan: Sandra Sanchez / Product Type:  /      In time: 3127U Out time: 1120a   Total Treatment Time: 60     BCBS/Medicare Time Tracking (below)   Total Timed Codes (min):  NA 1:1 Treatment Time:  NA     TREATMENT AREA =  Neck pain [M54.2]    SUBJECTIVE  Pain Level (on 0 to 10 scale):  0  / 10   Medication Changes/New allergies or changes in medical history, any new surgeries or procedures? NO    If yes, update Summary List   Subjective Functional Status/Changes:  []  No changes reported     I drove 12 hours to and from [place] last week and I felt great. I tried the towel roll at my back and it helped a lot. Im optimistic to return to work on the . Modalities Rationale:     decrease edema, decrease inflammation, decrease pain and increase tissue extensibility to improve patient's ability to perform pain-free ADLs.    15 min [x] Estim, type/location: IFC c/s with heat in supine                                     []  att     [x]  unatt     []  w/US     []  w/ice    [x]  w/heat    min []  Mechanical Traction: type/lbs                   []  pro   []  sup   []  int   []  cont    []  before manual    []  after manual    min []  Ultrasound, settings/location:      min []  Iontophoresis w/ dexamethasone, location:                                               []  take home patch       []  in clinic    min []  Ice     []  Heat    location/position:     min []  Vasopneumatic Device, press/temp:     min []  Other:    [x] Skin assessment post-treatment (if applicable):    [x]  intact    [x]  redness- no adverse reaction     []redness  adverse reaction:        30 min Therapeutic Exercise:  [x]  See flow sheet   Rationale:      increase ROM, increase strength and improve coordination to improve the patients ability to maintain improved posture for prolonged periods     15 min Manual Therapy: UT manual stretch, first rib depression with UT static hold, AO flexion, SOR   Rationale:      decrease pain, increase ROM, increase tissue extensibility and decrease edema  to improve patient's ability to maintain improved posture for prolonged periods. The manual therapy interventions were performed at a separate and distinct time from the therapeutic activities interventions. Billed With/As:   [x] TE   [] TA   [] Neuro   [] Self Care Patient Education: [x] Review HEP    [] Progressed/Changed HEP based on:   [] positioning   [] body mechanics   [] transfers   [] heat/ice application    [] other:      Other Objective/Functional Measures:    TE per FS   Post Treatment Pain Level (on 0 to 10) scale:   0  / 10     ASSESSMENT  Assessment/Changes in Function:     Patient maintained progress during week long trip and didn't experience any exacerbations in pain. Improved ability to maintain core contraction with banded rows on SB     []  See Progress Note/Recertification   Patient will continue to benefit from skilled PT services to modify and progress therapeutic interventions, address functional mobility deficits, address ROM deficits, address strength deficits, analyze and address soft tissue restrictions, analyze and cue movement patterns, analyze and modify body mechanics/ergonomics and assess and modify postural abnormalities to attain remaining goals. Progress toward goals / Updated goals:    Progressing towards LTG #3.      PLAN  [x]  Upgrade activities as tolerated YES Continue plan of care   []  Discharge due to :    []  Other:      Therapist: Obi Vargas DPT    Date: 1/25/2021 Time: 10:39 AM     Future Appointments   Date Time Provider Shirlene Gómez   1/27/2021  1:15 PM Osmin Love PT [facility]   1/29/2021 12:45 PM [facility]

## 2021-01-27 ENCOUNTER — HOSPITAL ENCOUNTER (OUTPATIENT)
Dept: PHYSICAL THERAPY | Age: 46
Discharge: HOME | End: 2021-01-27
Payer: OTHER GOVERNMENT

## 2021-01-27 PROCEDURE — 97110 THERAPEUTIC EXERCISES: CPT

## 2021-01-27 PROCEDURE — 97140 MANUAL THERAPY 1/> REGIONS: CPT

## 2021-01-27 NOTE — PROGRESS NOTES
PHYSICAL THERAPY - DAILY TREATMENT NOTE    Patient Name: Clifton Antunez        Date: 2021  : 1975   YES Patient  Verified  Visit #:      20  Insurance: Payor:  / Plan: Jv Matias / Product Type:  /      In time: 115p Out time: 210p   Total Treatment Time: 55     BCBS/Medicare Time Tracking (below)   Total Timed Codes (min):  45 1:1 Treatment Time:  45     TREATMENT AREA =  Neck pain [M54.2]    SUBJECTIVE  Pain Level (on 0 to 10 scale):  0  / 10   Medication Changes/New allergies or changes in medical history, any new surgeries or procedures? NO    If yes, update Summary List   Subjective Functional Status/Changes:  []  No changes reported     I feel so good, I notice I get farther in my range before I get tight. Modalities Rationale:     decrease edema, decrease inflammation, decrease pain and increase tissue extensibility to improve patient's ability to perform pain-free ADLs.     min [] Estim, type/location:                                      []  att     []  unatt     []  w/US     []  w/ice    []  w/heat    min []  Mechanical Traction: type/lbs                   []  pro   []  sup   []  int   []  cont    []  before manual    []  after manual    min []  Ultrasound, settings/location:      min []  Iontophoresis w/ dexamethasone, location:                                               []  take home patch       []  in clinic   10 min []  Ice     [x]  Heat    location/position: C/s in supine    min []  Vasopneumatic Device, press/temp:     min []  Other:    [x] Skin assessment post-treatment (if applicable):    [x]  intact    [x]  redness- no adverse reaction     []redness  adverse reaction:        35 min Therapeutic Exercise:  [x]  See flow sheet   Rationale:      increase ROM, increase strength and improve coordination to improve the patients ability to resume OH activities without exacerbations in pain     10 min Manual Therapy: First rib depression with diaphragmatic breathing, upper trap stretch, STM pec major and minor   Rationale:      decrease pain, increase ROM, increase tissue extensibility and decrease edema  to improve patient's ability to maintain improved posture for prolonged periods. The manual therapy interventions were performed at a separate and distinct time from the therapeutic activities interventions. Billed With/As:   [x] TE   [] TA   [] Neuro   [] Self Care Patient Education: [x] Review HEP    [] Progressed/Changed HEP based on:   [] positioning   [] body mechanics   [] transfers   [] heat/ice application    [] other:      Other Objective/Functional Measures: Added lat pull down and planks with protraction     Post Treatment Pain Level (on 0 to 10) scale:   0  / 10     ASSESSMENT  Assessment/Changes in Function:     Continued progress with active pain-free cervical ROM. Reports neck and shoulders feel \"looser\" and he feels great with driving. Less cues required for correct form through all exercises. []  See Progress Note/Recertification   Patient will continue to benefit from skilled PT services to modify and progress therapeutic interventions, address functional mobility deficits, address ROM deficits, address strength deficits, analyze and address soft tissue restrictions, analyze and cue movement patterns, analyze and modify body mechanics/ergonomics and assess and modify postural abnormalities to attain remaining goals. Progress toward goals / Updated goals:    Progressing towards LTG #4. Plan to discharge end of next week.      PLAN  [x]  Upgrade activities as tolerated YES Continue plan of care   []  Discharge due to :    []  Other:      Therapist: Clemencia Vance DPT    Date: 1/27/2021 Time: 2:04 PM     Future Appointments   Date Time Provider Shirlene Gómez   1/29/2021 12:45 PM Chris Gramajo PT [facility]   2/2/2021 10:15 AM Chris Gramajo PT [facility]   2/4/2021 10:15 AM Chris Gramajo PT [facility]   2/9/2021 10:15 AM Jackie Walker PT [facility] [place] [facility]   2/11/2021 11:00 AM IRMA Sharma [facility]

## 2021-01-29 ENCOUNTER — APPOINTMENT (OUTPATIENT)
Dept: PHYSICAL THERAPY | Age: 46
End: 2021-01-29
Payer: OTHER GOVERNMENT

## 2021-02-02 ENCOUNTER — APPOINTMENT (OUTPATIENT)
Dept: PHYSICAL THERAPY | Age: 46
End: 2021-02-02
Payer: OTHER GOVERNMENT

## 2021-02-04 ENCOUNTER — APPOINTMENT (OUTPATIENT)
Dept: PHYSICAL THERAPY | Age: 46
End: 2021-02-04
Payer: OTHER GOVERNMENT

## 2021-02-09 ENCOUNTER — HOSPITAL ENCOUNTER (OUTPATIENT)
Dept: PHYSICAL THERAPY | Age: 46
Discharge: HOME | End: 2021-02-09
Payer: OTHER GOVERNMENT

## 2021-02-09 PROCEDURE — 97140 MANUAL THERAPY 1/> REGIONS: CPT

## 2021-02-09 PROCEDURE — 97110 THERAPEUTIC EXERCISES: CPT

## 2021-02-09 NOTE — PROGRESS NOTES
PHYSICAL THERAPY - DAILY TREATMENT NOTE    Patient Name: Dereje Loyd        Date: 2021  : 1975   YES Patient  Verified  Visit #:     Insurance: Payor:  / Plan: Jer Melara 73 / Product Type:  /      In time: 3428D Out time: 4653E   Total Treatment Time: 55     BCBS/Medicare Time Tracking (below)   Total Timed Codes (min):  NA 1:1 Treatment Time:  NA     TREATMENT AREA =  Neck pain [M54.2]    SUBJECTIVE  Pain Level (on 0 to 10 scale):  0  / 10   Medication Changes/New allergies or changes in medical history, any new surgeries or procedures? NO    If yes, update Summary List   Subjective Functional Status/Changes:  []  No changes reported     I feel good, its still just stiff. But I noticed I can get more range and it doesn't click as much when I drive. Modalities Rationale:     decrease edema, decrease inflammation, decrease pain and increase tissue extensibility to improve patient's ability to perform pain-free ADLs.     min [] Estim, type/location:                                      []  att     []  unatt     []  w/US     []  w/ice    []  w/heat    min []  Mechanical Traction: type/lbs                   []  pro   []  sup   []  int   []  cont    []  before manual    []  after manual    min []  Ultrasound, settings/location:      min []  Iontophoresis w/ dexamethasone, location:                                               []  take home patch       []  in clinic   10 min []  Ice     [x]  Heat    location/position: C/s and t/s in supine    min []  Vasopneumatic Device, press/temp:     min []  Other:    [x] Skin assessment post-treatment (if applicable):    [x]  intact    [x]  redness- no adverse reaction     []redness  adverse reaction:        35 min Therapeutic Exercise:  [x]  See flow sheet   Rationale:      increase ROM, increase strength, improve coordination and increase proprioception to improve the patients ability to maintain improved posture     10 min Manual Therapy: UT stretches, SOR, first rib depression with maintained UT stretch   Rationale:      decrease pain, increase ROM, increase tissue extensibility and decrease edema  to improve patient's ability to maintain improved posture for prolonged periods  The manual therapy interventions were performed at a separate and distinct time from the therapeutic activities interventions.    Billed With/As:   [x] TE   [] TA   [] Neuro   [] Self Care Patient Education: [x] Review HEP    [] Progressed/Changed HEP based on:   [x] positioning   [] body mechanics   [] transfers   [] heat/ice application    [x] other: Maria Fernanda lumbar roll, Self STM stick     Other Objective/Functional Measures:    TE per FS     Post Treatment Pain Level (on 0 to 10) scale:   0  / 10     ASSESSMENT  Assessment/Changes in Function:     Patient is returning to work as FT  end of next week, spent lots of time today discussing tools to travel with including lumbar roll, self-STM theracane, and a towel to replace foam roll. Patient shows great improvement in ROM and resting posture and denies radicular symptoms in past month.      []  See Progress Note/Recertification   Patient will continue to benefit from skilled PT services to modify and progress therapeutic interventions, address functional mobility deficits, address ROM deficits, address strength deficits, analyze and address soft tissue restrictions, analyze and cue movement patterns, analyze and modify body mechanics/ergonomics and assess and modify postural abnormalities to attain remaining goals.   Progress toward goals / Updated goals:    Met all goals. Plan to discharge next session.      PLAN  [x]  Upgrade activities as tolerated YES Continue plan of care   []  Discharge due to :    []  Other:      Therapist: Will Espitia DPT    Date: 2/9/2021 Time: 11:07 AM     Future Appointments   Date Time Provider Department Center   2/11/2021 11:00 AM Will Espitia  PT [facility]

## 2021-02-11 ENCOUNTER — HOSPITAL ENCOUNTER (OUTPATIENT)
Dept: PHYSICAL THERAPY | Age: 46
Discharge: HOME | End: 2021-02-11
Payer: OTHER GOVERNMENT

## 2021-02-11 PROCEDURE — 97140 MANUAL THERAPY 1/> REGIONS: CPT

## 2021-02-11 PROCEDURE — 97110 THERAPEUTIC EXERCISES: CPT

## 2021-02-11 NOTE — PROGRESS NOTES
PHYSICAL THERAPY - DAILY TREATMENT NOTE    Patient Name: Chester Madsen        Date: 2021  : 1975   YES Patient  Verified  Visit #:   15   of   15  Insurance: Payor: CHRIS / Plan: Jer Melara 73 / Product Type:  /      In time: 9369M Out time: 1200   Total Treatment Time: 55     BCBS/Medicare Time Tracking (below)   Total Timed Codes (min):  NA 1:1 Treatment Time:  NA     TREATMENT AREA =  Neck pain [M54.2]    SUBJECTIVE  Pain Level (on 0 to 10 scale):  0  / 10   Medication Changes/New allergies or changes in medical history, any new surgeries or procedures? NO    If yes, update Summary List   Subjective Functional Status/Changes:  []  No changes reported     I feel great today           Modalities Rationale:     decrease edema, decrease inflammation and decrease pain to improve patient's ability to perform pain-free ADLs.     min [] Estim, type/location:                                      []  att     []  unatt     []  w/US     []  w/ice    []  w/heat    min []  Mechanical Traction: type/lbs                   []  pro   []  sup   []  int   []  cont    []  before manual    []  after manual    min []  Ultrasound, settings/location:      min []  Iontophoresis w/ dexamethasone, location:                                               []  take home patch       []  in clinic   10 min []  Ice     [x]  Heat    location/position: To c/s in supine    min []  Vasopneumatic Device, press/temp:     min []  Other:    [x] Skin assessment post-treatment (if applicable):    [x]  intact    [x]  redness- no adverse reaction     []redness  adverse reaction:        35 min Therapeutic Exercise:  [x]  See flow sheet   Rationale:      increase ROM, increase strength and improve coordination to improve the patients ability to maintain improved posture     10 min Manual Therapy: UT stretches, SOR, first rib depression with maintained UT stretch and diaphragmatic breathing   Rationale:      decrease pain, increase ROM, increase tissue extensibility and decrease edema  to improve patient's ability to improve seated posture  The manual therapy interventions were performed at a separate and distinct time from the therapeutic activities interventions. Billed With/As:   [x] TE   [] TA   [] Neuro   [] Self Care Patient Education: [x] Review HEP    [] Progressed/Changed HEP based on:   [] positioning   [] body mechanics   [] transfers   [] heat/ice application    [] other:      Other Objective/Functional Measures:    See DC note     Post Treatment Pain Level (on 0 to 10) scale:   0  / 10     ASSESSMENT  Assessment/Changes in Function:     See DC note     [x]  See Progress Note/Recertification      Progress toward goals / Updated goals:    See DC note     PLAN  []  Upgrade activities as tolerated YES Continue plan of care   [x]  Discharge due to : Met all goals   []  Other:      Therapist: Regulo Ahuja DPT    Date: 2/11/2021 Time: 1:33 PM     No future appointments.

## 2021-05-21 ENCOUNTER — HOSPITAL ENCOUNTER (OUTPATIENT)
Dept: PHYSICAL THERAPY | Age: 46
Discharge: HOME | End: 2021-05-21

## 2021-05-21 NOTE — PROGRESS NOTES
PHYSICAL THERAPY - DAILY TREATMENT NOTE    Patient Name: Ann Marie Denny        Date: 2021  : 1975   YES Patient  Verified  Visit #:      12  Insurance: Payor: CHRIS / Plan: Jer Melara 73 / Product Type:  /      In time: 945 Out time: 1030   Total Treatment Time: 45     BCBS/Medicare Time Tracking (below)   Total Timed Codes (min):  NA 1:1 Treatment Time:  NA     TREATMENT AREA =  Neck pain [M54.2]    SUBJECTIVE  Pain Level (on 0 to 10 scale):  3-7  / 10   Medication Changes/New allergies or changes in medical history, any new surgeries or procedures? NO    If yes, update Summary List   Subjective Functional Status/Changes:  []  No changes reported      Patient is a 54 y.o. male who presents to [facility] with complaints of neck pain. Patient was seen for 3 months for the same issue at [facility] in 2021, until leaving to return to work as a  full time. Patient reports he was pain free and mobile for the first 2 months of returning to work but has had return of symptoms in the past 4 weeks. Pt reports significant stiffness/discomfort L side cervical spine and that pain has progressed to L shoulder and thoracic spine, sometimes waking him at night. Reports mild improvement with previous HEP but that he only performs 2-3 of the exercises regularly           Modalities Rationale:     decrease edema, decrease inflammation, and decrease pain to improve patient's ability to perform pain-free ADLs.     min [] Estim, type/location:                                      []  att     []  unatt     []  w/US     []  w/ice    []  w/heat    min []  Mechanical Traction: type/lbs                   []  pro   []  sup   []  int   []  cont    []  before manual    []  after manual    min []  Ultrasound, settings/location:      min []  Iontophoresis w/ dexamethasone, location:                                               []  take home patch       []  in clinic   PD min []  Ice     []  Heat    location/position:     min []  Vasopneumatic Device, press/temp:     min []  Other:    [] Skin assessment post-treatment (if applicable):    []  intact    []  redness- no adverse reaction     []redness  adverse reaction:        15 min Therapeutic Exercise:  [x]  See flow sheet   Rationale:      increase ROM, increase strength, improve coordination, improve balance and increase proprioception to improve the patients ability to imrove resting posture       Billed With/As:   [x] TE   [] TA   [] Neuro   [] Self Care Patient Education: [x] Review HEP    [] Progressed/Changed HEP based on:   [] positioning   [] body mechanics   [] transfers   [] heat/ice application    [] other:      Other Objective/Functional Measures:    See POC     Post Treatment Pain Level (on 0 to 10) scale:   2  / 10     ASSESSMENT  Assessment/Changes in Function:     See POC     []  See Progress Note/Recertification   Patient will continue to benefit from skilled PT services to modify and progress therapeutic interventions, address functional mobility deficits, address ROM deficits, address strength deficits, analyze and address soft tissue restrictions, analyze and cue movement patterns, analyze and modify body mechanics/ergonomics and assess and modify postural abnormalities to attain remaining goals. Progress toward goals / Updated goals:    See POC for new short and long term goals     PLAN  [x]  Upgrade activities as tolerated YES Continue plan of care   []  Discharge due to :    []  Other:      Therapist: Pamela Mejia DPT    Date: 5/21/2021 Time: 12:48 PM     No future appointments.

## 2021-05-21 NOTE — PROGRESS NOTES
[address] [facility] PHYSICAL THERAPY  [facility]  [address], [address], [address]  Phone: [phone]  Fax: [phone] / STATEMENT OF MEDICAL NECESSITY FOR PHYSICAL THERAPY SERVICES  Patient Name: Fara Bright : 1975   Medical   Diagnosis: Neck pain [M54.2] Treatment Diagnosis: Neck pain   Onset Date: 2020     Referral Source: Simeon Anthony DO Start of Care [facility]): 2021   Prior Hospitalization: See medical history Provider #: [id number]   Prior Level of Function: ADLs pain-free, full time work as    Comorbidities: Arthritis   Medications: Verified on Patient Summary List   The Plan of Care and following information is based on the information from the initial evaluation.   ===========================================================================================  Assessment / key information:  Patient is a 54 y.o. male who presents to [facility] with complaints of neck pain. Patient was seen for 3 months for the same issue at [facility] in 2021, until leaving to return to work as a  full time. Patient reports he was pain free and mobile for the first 2 months of returning to work but has had return of symptoms in the past 4 weeks. Pt reports significant stiffness/discomfort L side cervical spine and that pain has progressed to L shoulder and thoracic spine, sometimes waking him at night. Reports mild improvement with previous HEP but that he only performs 2-3 of the exercises regularly. Pt reports 3/10 pain currently and 7/10 pain at worst after full day of work or sleeping on L side. Patient presents with FHRS posture and increased thoracic kyphosis with collapse into PPT in sitting.     Patient presents with the following:  AROM: Cervical ROM 50% available into BL rot and flexion with pulling reported, 10% available into BL SB, extension occurs with only upper c/s, no movement of thoracic spine with cervical motion  Shoulder ROM WNL with painful arc L side, mild limitation into IR/ER L>R  MMT: L shoulder globally 4-/5 with mild pain reported during flex/abd  Special tests: + neer impingement L, + painful arc L, DCF test 11 sec (normal 30s), TTP along T2-T6 L facet joints with little/no PIVM    Patient was provided with an initial HEP to address above deficits, improve thoracic ROM and dec postural dysfunction. Physical Therapy services will be beneficial in order to decrease pain, improve ROM and resting posture in order to return to work and pain-free PLOF.   ===========================================================================================  Eval Complexity: History MEDIUM  Complexity : 1-2 comorbidities / personal factors will impact the outcome/ POC ;  Examination  MEDIUM Complexity : 3 Standardized tests and measures addressing body structure, function, activity limitation and / or participation in recreation ; Presentation MEDIUM Complexity : Evolving with changing characteristics ;   Decision Making MEDIUM Complexity : FOTO score of 26-74; Overall Complexity MEDIUM  Problem List: pain affecting function, decrease ROM, decrease strength, edema affecting function, impaired gait/ balance, decrease ADL/ functional abilitiies and decrease activity tolerance   Treatment Plan may include any combination of the following: Therapeutic exercise, Therapeutic activities, Neuromuscular re-education, Physical agent/modality, Gait/balance training, Manual therapy, Patient education, Self Care training and Functional mobility training  Patient / Family readiness to learn indicated by: asking questions, trying to perform skills and interest  Persons(s) to be included in education: patient (P)  Barriers to Learning/Limitations: None  Measures taken, if barriers to learning:    Patient Goal (s): \"keep improving neck flexibility, improve upper back flexibility   Patient self reported health status: excellent  Rehabilitation Potential: good   Short Term Goals: To be accomplished in  2  weeks:  1. Patient will be independent and compliant with initial HEP. 2. Patient will report decreased pain levels to 0/10 in order to sleep through the night pain-free  3. Demonstrate improved cervical SB ROM to 30 degrees bilaterally in order to indicate improved mobility an postural control.  Long Term Goals: To be accomplished in  4  weeks:  1. Patient will be independent and compliant with progressive HEP for postural contorl and cervicothoracic mobility in order to maintain gains made with physical therapy. 2. Improve FOTO score from 54 to > or = 66 in order to indicate improved ease with work related requirements. 3. Demonstrate negative painful arc of motion on L shoulder in order to indicate improved capsular mobility and postural control. 4. Report ability to tolerate 4 hour flight without return of symptoms in order to demonstrate improved resting posture. Frequency / Duration:   Patient to be seen  1-2  times per week for 4-6  weeks:  Patient / Caregiver education and instruction: self care, activity modification and exercises  Therapist Signature: Jaelyn Santizo DPT Date: 2/57/8947   Certification Period: NA Time: 9:29 AM   ===========================================================================================  I certify that the above Physical Therapy Services are being furnished while the patient is under my care. I agree with the treatment plan and certify that this therapy is necessary. Physician Signature:        Date:       Time:     Please sign and return to [facility] [place] or you may fax the signed copy to [phone]. Thank you.

## 2021-05-26 ENCOUNTER — HOSPITAL ENCOUNTER (OUTPATIENT)
Dept: PHYSICAL THERAPY | Age: 46
End: 2021-05-26

## 2021-06-15 ENCOUNTER — HOSPITAL ENCOUNTER (OUTPATIENT)
Dept: PHYSICAL THERAPY | Age: 46
Discharge: HOME | End: 2021-06-15
Payer: OTHER GOVERNMENT

## 2021-06-15 PROCEDURE — 97140 MANUAL THERAPY 1/> REGIONS: CPT

## 2021-06-15 PROCEDURE — 97110 THERAPEUTIC EXERCISES: CPT

## 2021-06-15 NOTE — PROGRESS NOTES
PHYSICAL THERAPY - DAILY TREATMENT NOTE    Patient Name: Diogenes Shirley        Date: 6/15/2021  : 1975   YES Patient  Verified  Visit #:   2   of   12  Insurance: Payor: CHRIS / Plan: Jer Melara 73 / Product Type:  /      In time: 7585 Out time: 1200   Total Treatment Time: 55     BCBS/Medicare Time Tracking (below)   Total Timed Codes (min):  45 1:1 Treatment Time:  45     TREATMENT AREA =  Neck pain [M54.2]    SUBJECTIVE  Pain Level (on 0 to 10 scale):  0  / 10   Medication Changes/New allergies or changes in medical history, any new surgeries or procedures? NO    If yes, update Summary List   Subjective Functional Status/Changes:  []  No changes reported     I am not in pain but I am always stiff, especially on longer flights. Modalities Rationale:     decrease edema, decrease inflammation, decrease pain and increase tissue extensibility to improve patient's ability to perform pain-free ADLs. min [] Estim, type/location:                                      []  att     []  unatt     []  w/US     []  w/ice    []  w/heat    min []  Mechanical Traction: type/lbs                   []  pro   []  sup   []  int   []  cont    []  before manual    []  after manual    min []  Ultrasound, settings/location:      min []  Iontophoresis w/ dexamethasone, location:                                               []  take home patch       []  in clinic   10 min []  Ice     [x]  Heat    location/position: C/s and upper back in supine    min []  Vasopneumatic Device, press/temp:     min []  Other:    [x] Skin assessment post-treatment (if applicable):    [x]  intact    [x]  redness- no adverse reaction     []redness  adverse reaction:        25 min Therapeutic Exercise:  [x]  See flow sheet   Rationale:      increase ROM, increase strength, improve coordination and improve balance to improve the patients ability to perform unlimited ADLs.       15 min Manual Therapy: STM/MFR suboccipitals and UT, SOR, manual upper trap and pec stretches   Rationale:      decrease pain, increase ROM, increase tissue extensibility and decrease edema  to improve patient's ability to improve resting posture  The manual therapy interventions were performed at a separate and distinct time from the therapeutic activities interventions. Billed With/As:   [x] TE   [] TA   [] Neuro   [] Self Care Patient Education: [x] Review HEP    [] Progressed/Changed HEP based on:   [] positioning   [] body mechanics   [] transfers   [] heat/ice application    [] other:      Other Objective/Functional Measures: Added Tband rows, post capsule stretch and prone cervical retraction     Post Treatment Pain Level (on 0 to 10) scale:   0  / 10     ASSESSMENT  Assessment/Changes in Function:     Patient demonstrated improvements in suboccipital tension by end of session, was able to demonstrate prone cervical retraction with improved range. Continues to show limitiations in active cervical ROM L>R and reports slight TTP to supraspinatus tendon. Educated on importance of posture during long flights to prevent any increased irritation of rotator cuff musculature. []  See Progress Note/Recertification   Patient will continue to benefit from skilled PT services to modify and progress therapeutic interventions, address functional mobility deficits, address ROM deficits, address strength deficits, analyze and address soft tissue restrictions, analyze and cue movement patterns, analyze and modify body mechanics/ergonomics and assess and modify postural abnormalities to attain remaining goals.    Progress toward goals / Updated goals:    Progressing towards ROM goals     PLAN  [x]  Upgrade activities as tolerated YES Continue plan of care   []  Discharge due to :    []  Other:      Therapist: Simran Ruiz DPT    Date: 6/15/2021 Time: 2:23 PM     Future Appointments   Date Time Provider Shirlene Gómez   6/22/2021  1:15 PM Jefe Martinez, PT [facility]   6/24/2021  5:45 PM [facility]

## 2021-06-15 NOTE — PROGRESS NOTES
[facility] at [address] [facility] [address] [facility], [address] [place] [address]  Phone [phone]   Fax: [phone]    Physical Therapy Home E-STIM/NMES Unit Recommendation      Patient's name Joan Elizalde   Referring Physician:Tobi Driscoll   Treatment Diagnosis: neck pain and L shoulder pain YOB: 1975   MRN: [id number]     Onset Date:08/2020  Date of Service:6/15/2021    Total Treatments: 2       Patient may benefit from E-STIM/IFC unit due to pain/stiffness and increased tension in the L shoulder and neck given benefits with use of E-STIM with physical therapy treatments. If you are in agreement, please sign below. Thank you for this referral.        Thank you,  Therapist: Oanh Suarez PT  Date: 6/15/2021  Time:11:11 AM  _______________________________________________________________________    I certify that the above Therapy Services are being furnished while the patient is under my care. I agree with the treatment plan and certify that this therapy is necessary. Y or N I have read the above and request that my patient continue as recommended. Y or N I have read the above report and request that my patient continue therapy with the following changes/special instructions:_____________________________________________________________  Y or N I have read the above report and do not wish to have a home ESTIM unit provided    Physician's Signature:____________________  Date:________________    Please sign and return to [facility] at [address] [facility], [address] [place] [address] Road  Or fax to [phone].

## 2021-06-22 ENCOUNTER — HOSPITAL ENCOUNTER (OUTPATIENT)
Dept: PHYSICAL THERAPY | Age: 46
Discharge: HOME | End: 2021-06-22
Payer: OTHER GOVERNMENT

## 2021-06-22 PROCEDURE — 97014 ELECTRIC STIMULATION THERAPY: CPT

## 2021-06-22 PROCEDURE — 97110 THERAPEUTIC EXERCISES: CPT

## 2021-06-22 PROCEDURE — 97140 MANUAL THERAPY 1/> REGIONS: CPT

## 2021-06-22 NOTE — PROGRESS NOTES
PHYSICAL THERAPY - DAILY TREATMENT NOTE    Patient Name: Fara Erp        Date: 2021  : 1975   YES Patient  Verified  Visit #:   3   of   12  Insurance: Payor: CHRIS / Plan: Jer Melara 74 / Product Type:  /      In time: 120 Out time: 220   Total Treatment Time: 60     BCBS/Medicare Time Tracking (below)   Total Timed Codes (min):  NA 1:1 Treatment Time:  NA     TREATMENT AREA =  Neck pain [M54.2]    SUBJECTIVE  Pain Level (on 0 to 10 scale):  0  / 10   Medication Changes/New allergies or changes in medical history, any new surgeries or procedures? NO    If yes, update Summary List   Subjective Functional Status/Changes:  []  No changes reported     I always feel good when I leave but then I go to work and fly for 6 hours at a time and I get stiff again. I keep stretching and using my lacross ball and theracane but it just comes right back . Modalities Rationale:     decrease edema, decrease inflammation, decrease pain and increase tissue extensibility to improve patient's ability to perform pain-free ADLs.     min [] Estim, type/location:                                      []  att     []  unatt     []  w/US     []  w/ice    []  w/heat    min []  Mechanical Traction: type/lbs                   []  pro   []  sup   []  int   []  cont    []  before manual    []  after manual    min []  Ultrasound, settings/location:      min []  Iontophoresis w/ dexamethasone, location:                                               []  take home patch       []  in clinic   10 min []  Ice     [x]  Heat    location/position: C/s and t/s in supine    min []  Vasopneumatic Device, press/temp:    If using vaso (only need to measure limb vaso being performed on)      pre-treatment girth :       post-treatment girth :       measured at (landmark location) :      min []  Other:    [x] Skin assessment post-treatment (if applicable):    [x]  intact    [x]  redness- no adverse reaction []redness  adverse reaction:        30 min Therapeutic Exercise:  [x]  See flow sheet   Rationale:      increase ROM, increase strength and improve coordination to improve the patients ability to improve endurance with resting posture. 15 min Manual Therapy: STM/MFR suboccipitals and UT, SOR, manual upper trap and pec stretches, medial glides C3-C6 L sided spinous processes. LIA with manual resistance to T3-T6 improve extension range   Rationale:      decrease pain, increase ROM, increase tissue extensibility and decrease edema  to improve patient's ability to improve resting posture  The manual therapy interventions were performed at a separate and distinct time from the therapeutic activities interventions. Billed With/As:   [x] TE   [] TA   [] Neuro   [] Self Care Patient Education: [x] Review HEP    [] Progressed/Changed HEP based on:   [] positioning   [] body mechanics   [] transfers   [] heat/ice application    [] other:      Other Objective/Functional Measures: Added LIA to improve thoracic extension, and prone Horz abd     Post Treatment Pain Level (on 0 to 10) scale:   0  / 10     ASSESSMENT  Assessment/Changes in Function:     Educated patient on improtance of strengthening exercises in addition to self- stretches and STM while away on work trips to improve postural control and prevent return of symptoms due to maintain exacerbating positions for prolonged periods. []  See Progress Note/Recertification   Patient will continue to benefit from skilled PT services to modify and progress therapeutic interventions, address functional mobility deficits, address ROM deficits, address strength deficits, analyze and address soft tissue restrictions, analyze and cue movement patterns, analyze and modify body mechanics/ergonomics and assess and modify postural abnormalities to attain remaining goals. Progress toward goals / Updated goals:    Progressing towards LTG 4.      PLAN  [x] Upgrade activities as tolerated YES Continue plan of care   []  Discharge due to :    []  Other:      Therapist: Evon Pastor DPT    Date: 6/22/2021 Time: 1:47 PM     Future Appointments   Date Time Provider Shirlene Gómez   6/24/2021  5:45 PM John Joseph, PT [facility]

## 2021-06-24 ENCOUNTER — APPOINTMENT (OUTPATIENT)
Dept: PHYSICAL THERAPY | Age: 46
End: 2021-06-24
Payer: OTHER GOVERNMENT

## 2021-07-15 NOTE — PROGRESS NOTES
[address] [facility] PHYSICAL THERAPY  [facility]  [address], [address] ,[address], [address]  Phone: [phone]  Fax: [phone] SUMMARY  Patient Name: Sada Avendano : 1975   Treatment/Medical Diagnosis: Neck pain [M54.2]   Referral Source: Rick Diane DO     Date of Initial Visit: 2021 Attended Visits: 3 Missed Visits: 1     SUMMARY OF TREATMENT  Stretching and strengthening of C/s and BL shoulders, nm re-education of scap stabilizers and DCF, postural educaiton, HEP prescription, Manual therapy to improve mobiltiy  CURRENT STATUS  Mr. Andry Tapia was seen for 2 follow up visits with gaps due to long trips working as a . Pt has not been able to return between work trips, so formal reassessment was unable to be performed. Per phone call with pt on 7/15/2021, pt reports improvement in symptoms with new home TENs unit and updated HEP. Pt is being discharged today with education to continue with HEP while away from clinic. RECOMMENDATIONS  Other: Unable to attend due to work demands    If you have any questions/comments please contact us directly at [phone]. Thank you for allowing us to assist in the care of your patient.     Therapist Signature: Kirsten Rodríguez PT Date: 7/15/2021     Time: 1:20 PM

## 2024-02-05 ENCOUNTER — HOSPITAL ENCOUNTER (OUTPATIENT)
Facility: HOSPITAL | Age: 49
Setting detail: RECURRING SERIES
Discharge: HOME | End: 2024-02-08
Payer: COMMERCIAL

## 2024-02-05 PROCEDURE — 97110 THERAPEUTIC EXERCISES: CPT

## 2024-02-05 PROCEDURE — 97162 PT EVAL MOD COMPLEX 30 MIN: CPT

## 2024-02-05 NOTE — PROGRESS NOTES
PHYSICAL / OCCUPATIONAL THERAPY - DAILY TREATMENT NOTE    Patient Name: Paul Bella    Date: 2024    : 1975  Insurance: Payor: VARUN / Plan: [facility] / Product Type: *No Product type* /      Patient  verified Yes     Visit #   Current / Total 1 12   Time   In / Out 9:45 10:24   Pain   In / Out 4/10 4/10   Subjective Functional Status/Changes: See Eval/POC.     TREATMENT AREA =  Cervicalgia [M54.2]  Pain in left shoulder [M25.512]    OBJECTIVE    29 min [x]Eval - untimed                      Therapeutic Procedures:    Tx Min Billable or 1:1 Min (if diff from Tx Min) Procedure, Rationale, Specifics   10 10 35410 Therapeutic Exercise (timed):  increase ROM, strength, coordination, balance, and proprioception to improve patient's ability to progress to PLOF and address remaining functional goals. (see flow sheet as applicable)     Details if applicable:  Patient instructed in and briefly performed beginning HEP.  Handouts with pictures and written directions for HEP to be issued  at [place]; copies will be placed in chart.  See Eval/POC.          Details if applicable:            Details if applicable:            Details if applicable:            Details if applicable:     10 10 [facility] Totals Reminder: bill using total billable min of TIMED therapeutic procedures (example: do not include dry needle or estim unattended, both untimed codes, in totals to left)  8-22 min = 1 unit; 23-37 min = 2 units; 38-52 min = 3 units; 53-67 min = 4 units; 68-82 min = 5 units   Total Total     [x]  Patient Education billed concurrently with other procedures   [x] Review HEP    [] Progressed/Changed HEP, detail:    [] Other detail:       Objective Information/Functional Measures/Assessment    See Eval/POC.    Patient will continue to benefit from skilled PT / OT services to modify and progress therapeutic interventions, analyze and address functional mobility deficits, analyze and address ROM deficits, analyze and

## 2024-02-05 NOTE — THERAPY EVALUATION
DOMENIC PANDYA [facility]  [address], [address], [place] [address] Ph: [phone] Fx: [phone]  Plan of Care / Statement of Necessity for Physical Therapy Services     Patient Name: Paul Bella : 1975   Medical   Diagnosis: Cervicalgia [M54.2]  Pain in left shoulder [M25.512] Treatment Diagnosis:  M25.512  LEFT SHOULDER PAIN and M54.2  NECK PAIN    Onset Date: ~2023, but chronic since ~     Referral Source: Femi Amaya MD Start of Care (SOC): 2024   Prior Hospitalization: See medical history Provider #: [id number]   Prior Level of Function: Independent work, ADLs, home chores, community mobility, sleep, and recreation/fitness with chronic/recurrent neck and left shoulder pain.   Comorbidities: Back Pain; Arthritis      Assessment / key information:  Patient is a 48 year old Right handed male referred to PT by \[facility]\"" doctor (2024) for Cervical Pain, Left UE Radiculopathy.  Patient seen previously for PT in this clinic for left sided neck pain and associated left shoulder pain and occasional tingling in fingers (15 visits, 20 to 21) and again for neck pain (3 visits, 21 to 21).  Pt returns to PT reporting past treatments effective and shoulders have been doing well, but neck pain symptoms have gradually worsened over time--working a lot the past few months had worsened the pain.  Pt also reports pain left posterior neck and upper scapular area, often feeling \"tight\" in left neck and across top of left shoulder area and some palpable tension/tenderness in these areas.  Pt relates that lately he has been experiencing \"burning\" pain left mid scapular area.  Pt states that neck pain is worse with fatigue.  Pt reports a little occasional numbness tips of left thumb and index/long fingers and also worse when fatigued, such as long flights for work, overnight flights, and lack of sleeping.  Pt relates that he does

## 2024-02-07 ENCOUNTER — HOSPITAL ENCOUNTER (OUTPATIENT)
Facility: HOSPITAL | Age: 49
Setting detail: RECURRING SERIES
Discharge: HOME | End: 2024-02-10
Payer: COMMERCIAL

## 2024-02-07 PROCEDURE — 97535 SELF CARE MNGMENT TRAINING: CPT

## 2024-02-07 PROCEDURE — 97140 MANUAL THERAPY 1/> REGIONS: CPT

## 2024-02-07 PROCEDURE — 97110 THERAPEUTIC EXERCISES: CPT

## 2024-02-07 NOTE — PROGRESS NOTES
PHYSICAL / OCCUPATIONAL THERAPY - DAILY TREATMENT NOTE    Patient Name: Paul Bella    Date: 2024    : 1975  Insurance: Payor: VARUN / [facility]: [facility] / Product Type: *No Product type* /      Patient  verified Yes     Visit #   Current / Total 2 12   Time   In / Out 1145 1225   Pain   In / Out 4 0   Subjective Functional Status/Changes: Reports pain along (L) UT, (L) rhomboid primarily     TREATMENT AREA =  Cervicalgia [M54.2]  Pain in left shoulder [M25.512]    OBJECTIVE        Therapeutic Procedures:    Tx Min Billable or 1:1 Min (if diff from Tx Min) Procedure, Rationale, Specifics   25  54933 Manual Therapy (timed):  decrease pain, increase ROM, and increase tissue extensibility to improve patient's ability to progress to PLOF and address remaining functional goals.  The manual therapy interventions were performed at a separate and distinct time from the therapeutic activities interventions . (see flow sheet as applicable)       Details if applicable:     10   03843 Therapeutic Exercise (timed):  increase ROM, strength, coordination, balance, and proprioception to improve patient's ability to progress to PLOF and address remaining functional goals. (see flow sheet as applicable)       Details if applicable:        28424 Neuromuscular Re-Education (timed):  improve balance, coordination, kinesthetic sense, posture, core stability and proprioception to improve patient's ability to develop conscious control of individual muscles and awareness of position of extremities in order to progress to PLOF and address remaining functional goals. (see flow sheet as applicable)       Details if applicable:        22208 Therapeutic Activity (timed):  use of dynamic activities replicating functional movements to increase ROM, strength, coordination, balance, and proprioception in order to improve patient's ability to progress to PLOF and address remaining functional goals.  (see flow sheet as applicable)

## 2024-02-14 ENCOUNTER — HOSPITAL ENCOUNTER (OUTPATIENT)
Facility: HOSPITAL | Age: 49
Setting detail: RECURRING SERIES
Discharge: HOME | End: 2024-02-17
Payer: COMMERCIAL

## 2024-02-14 PROCEDURE — 97140 MANUAL THERAPY 1/> REGIONS: CPT

## 2024-02-14 PROCEDURE — G0283 ELEC STIM OTHER THAN WOUND: HCPCS

## 2024-02-14 PROCEDURE — 97110 THERAPEUTIC EXERCISES: CPT

## 2024-02-14 NOTE — PROGRESS NOTES
performed at a separate and distinct time from the therapeutic activities interventions . (see flow sheet as applicable)       Details if applicable:     15   83083 Therapeutic Exercise (timed):  increase ROM, strength, coordination, balance, and proprioception to improve patient's ability to progress to PLOF and address remaining functional goals. (see flow sheet as applicable)       Details if applicable:        93337 Neuromuscular Re-Education (timed):  improve balance, coordination, kinesthetic sense, posture, core stability and proprioception to improve patient's ability to develop conscious control of individual muscles and awareness of position of extremities in order to progress to PLOF and address remaining functional goals. (see flow sheet as applicable)       Details if applicable:        74707 Therapeutic Activity (timed):  use of dynamic activities replicating functional movements to increase ROM, strength, coordination, balance, and proprioception in order to improve patient's ability to progress to PLOF and address remaining functional goals.  (see flow sheet as applicable)       Details if applicable:        90710 Self Care/Home Management (timed):  improve patient knowledge and understanding of pain reducing techniques, positioning, posture/ergonomics, activity modification, and diagnosis/prognosis  to improve patient's ability to progress to PLOF and address remaining functional goals.  (see flow sheet as applicable)       Details if applicable:     50  [facility] Totals Reminder: bill using total billable min of TIMED therapeutic procedures (example: do not include dry needle or estim unattended, both untimed codes, in totals to left)  8-22 min = 1 unit; 23-37 min = 2 units; 38-52 min = 3 units; 53-67 min = 4 units; 68-82 min = 5 units   Total Total     [x]  Patient Education billed concurrently with other procedures   [x] Review HEP    [] Progressed/Changed HEP, detail:    [] Other detail:

## 2024-02-16 ENCOUNTER — HOSPITAL ENCOUNTER (OUTPATIENT)
Facility: HOSPITAL | Age: 49
Setting detail: RECURRING SERIES
Discharge: HOME | End: 2024-02-19
Payer: COMMERCIAL

## 2024-02-16 PROCEDURE — 97140 MANUAL THERAPY 1/> REGIONS: CPT

## 2024-02-16 PROCEDURE — G0283 ELEC STIM OTHER THAN WOUND: HCPCS

## 2024-02-16 PROCEDURE — 97110 THERAPEUTIC EXERCISES: CPT

## 2024-02-16 NOTE — PROGRESS NOTES
PHYSICAL / OCCUPATIONAL THERAPY - DAILY TREATMENT NOTE    Patient Name: Paul Bella    Date: 2024    : 1975  Insurance: Payor: VARUN / [facility]: [facility] / Product Type: *No Product type* /      Patient  verified Yes     Visit #   Current / Total 4 12   Time   In / Out 1020 1110   Pain   In / Out 6 4   Subjective Functional Status/Changes: I think it was the way I slept last night, woke up stiff on the (L) side and more pain.  More pain with (L) rotation.     TREATMENT AREA =  Cervicalgia [M54.2]  Pain in left shoulder [M25.512]    OBJECTIVE  Modalities Rationale:     decrease inflammation, decrease pain, and increase tissue extensibility to improve patient's ability to progress to PLOF and address remaining functional goals.    10 min [] Estim Unattended, type/location:  IFC C/S and UT spuine                                    [x]  w/ice    []  w/heat    min [] Estim Attended, type/location:                                     []  w/US     []  w/ice    []  w/heat    []  TENS insruct      min []  Mechanical Traction: type/lbs                   []  pro   []  sup   []  int   []  cont    []  before manual    []  after manual    min []  Ultrasound, settings/location:      min  unbill []  Ice     []  Heat    location/position:     min []  Paraffin,  details:     min []  Vasopneumatic Device, press/temp:     min []  Whirlpool / Fluido:    If using vaso (only need to measure limb vaso being performed on)      pre-treatment girth :       post-treatment girth :       measured at (landmark location) :      min []  Other:    Skin assessment post-treatment:   Intact         Therapeutic Procedures:    Tx Min Billable or 1:1 Min (if diff from Tx Min) Procedure, Rationale, Specifics   79 [id number] Manual Therapy (timed):  decrease pain, increase ROM, and increase tissue extensibility to improve patient's ability to progress to PLOF and address remaining functional goals.  The manual therapy interventions were

## 2024-02-19 ENCOUNTER — HOSPITAL ENCOUNTER (OUTPATIENT)
Facility: HOSPITAL | Age: 49
Setting detail: RECURRING SERIES
Discharge: HOME | End: 2024-02-22
Payer: COMMERCIAL

## 2024-02-19 PROCEDURE — G0283 ELEC STIM OTHER THAN WOUND: HCPCS

## 2024-02-19 PROCEDURE — 97530 THERAPEUTIC ACTIVITIES: CPT

## 2024-02-19 PROCEDURE — 97110 THERAPEUTIC EXERCISES: CPT

## 2024-02-19 PROCEDURE — 97140 MANUAL THERAPY 1/> REGIONS: CPT

## 2024-02-19 NOTE — PROGRESS NOTES
applicable:       12  64027 Therapeutic Activity (timed):  use of dynamic activities replicating functional movements to increase ROM, strength, coordination, balance, and proprioception in order to improve patient's ability to progress to PLOF and address remaining functional goals.  (see flow sheet as applicable)     Details if applicable:  review use of neutral spine posture with exercises; supportive foot wear for daily walking    20  70437 Manual Therapy (timed):  decrease pain, increase ROM, increase tissue extensibility, and decrease trigger points to improve patient's ability to progress to PLOF and address remaining functional goals.  The manual therapy interventions were performed at a separate and distinct time from the therapeutic activities interventions . (see flow sheet as applicable)     Details if applicable:  prone P/A jt mobs gr 1-2 TS paraspinals ~ T4 -T10 ; TrPt release to JANICE UE , Rhomboids, left side lying PROM PNF D1/D2 scapular patterns left shoulder           Details if applicable:            Details if applicable:     55  [facility] Totals Reminder: bill using total billable min of TIMED therapeutic procedures (example: do not include dry needle or estim unattended, both untimed codes, in totals to left)  8-22 min = 1 unit; 23-37 min = 2 units; 38-52 min = 3 units; 53-67 min = 4 units; 68-82 min = 5 units   Total Total     [x]  Patient Education billed concurrently with other procedures   [x] Review HEP    [] Progressed/Changed HEP, detail:    [] Other detail:       Objective Information/Functional Measures/Assessment    CS: FF: ~95%, ext: 90%, RR: 90%-tight, LR: 85%-tight-slight pain; LSB: 50%-tight, RSB: 50%- tight L UT  Review proper form in RRIS- improving AROM into bilateral rotation after retraction  TrPt tenderness noted in L UT, Lev and medial border of scapular-add PNF scapular patterns-PROM D1/D2 to improve scapular mobility    Patient will continue to benefit from skilled PT / OT

## 2024-02-21 ENCOUNTER — HOSPITAL ENCOUNTER (OUTPATIENT)
Facility: HOSPITAL | Age: 49
Setting detail: RECURRING SERIES
Discharge: HOME | End: 2024-02-24
Payer: COMMERCIAL

## 2024-02-21 PROCEDURE — 97110 THERAPEUTIC EXERCISES: CPT

## 2024-02-21 PROCEDURE — G0283 ELEC STIM OTHER THAN WOUND: HCPCS

## 2024-02-21 PROCEDURE — 97140 MANUAL THERAPY 1/> REGIONS: CPT

## 2024-02-21 NOTE — PROGRESS NOTES
PHYSICAL / OCCUPATIONAL THERAPY - DAILY TREATMENT NOTE    Patient Name: Paul Bella    Date: 2024    : 1975  Insurance: Payor: VARUN / [facility]: [facility] / Product Type: *No Product type* /      Patient  verified Yes     Visit #   Current / Total 6 12   Time   In / Out 145 235   Pain   In / Out 5 3   Subjective Functional Status/Changes: Still getting that nerve pain in my shoulder blade but overall doing better.     TREATMENT AREA =  Cervicalgia [M54.2]  Pain in left shoulder [M25.512]    OBJECTIVE  Modalities Rationale:     decrease inflammation, decrease pain, and increase tissue extensibility to improve patient's ability to progress to PLOF and address remaining functional goals.    10 min [] Estim Unattended, type/location:  IFC C/S and UT spuine                                    [x]  w/ice    []  w/heat    min [] Estim Attended, type/location:                                     []  w/US     []  w/ice    []  w/heat    []  TENS insruct      min []  Mechanical Traction: type/lbs                   []  pro   []  sup   []  int   []  cont    []  before manual    []  after manual    min []  Ultrasound, settings/location:      min  unbill []  Ice     []  Heat    location/position:     min []  Paraffin,  details:     min []  Vasopneumatic Device, press/temp:     min []  Whirlpool / Fluido:    If using vaso (only need to measure limb vaso being performed on)      pre-treatment girth :       post-treatment girth :       measured at (landmark location) :      min []  Other:    Skin assessment post-treatment:   Intact         Therapeutic Procedures:    Tx Min Billable or 1:1 Min (if diff from Tx Min) Procedure, Rationale, Specifics   25  63564 Manual Therapy (timed):  decrease pain, increase ROM, and increase tissue extensibility to improve patient's ability to progress to PLOF and address remaining functional goals.  The manual therapy interventions were performed at a separate and distinct time

## 2024-02-26 ENCOUNTER — APPOINTMENT (OUTPATIENT)
Facility: HOSPITAL | Age: 49
End: 2024-02-26
Payer: COMMERCIAL

## 2024-02-27 ENCOUNTER — HOSPITAL ENCOUNTER (OUTPATIENT)
Facility: HOSPITAL | Age: 49
Setting detail: RECURRING SERIES
Discharge: HOME | End: 2024-03-01
Payer: COMMERCIAL

## 2024-02-27 PROCEDURE — 97140 MANUAL THERAPY 1/> REGIONS: CPT

## 2024-02-27 PROCEDURE — 97032 APPL MODALITY 1+ESTIM EA 15: CPT

## 2024-02-27 PROCEDURE — 97110 THERAPEUTIC EXERCISES: CPT

## 2024-02-27 NOTE — PROGRESS NOTES
therapy interventions were performed at a separate and distinct time from the therapeutic activities interventions . (see flow sheet as applicable)       Details if applicable:     12   39312 Therapeutic Exercise (timed):  increase ROM, strength, coordination, balance, and proprioception to improve patient's ability to progress to PLOF and address remaining functional goals. (see flow sheet as applicable)       Details if applicable:        16103 Neuromuscular Re-Education (timed):  improve balance, coordination, kinesthetic sense, posture, core stability and proprioception to improve patient's ability to develop conscious control of individual muscles and awareness of position of extremities in order to progress to PLOF and address remaining functional goals. (see flow sheet as applicable)       Details if applicable:        18942 Therapeutic Activity (timed):  use of dynamic activities replicating functional movements to increase ROM, strength, coordination, balance, and proprioception in order to improve patient's ability to progress to PLOF and address remaining functional goals.  (see flow sheet as applicable)       Details if applicable:        43806 Self Care/Home Management (timed):  improve patient knowledge and understanding of pain reducing techniques, positioning, posture/ergonomics, activity modification, and diagnosis/prognosis  to improve patient's ability to progress to PLOF and address remaining functional goals.  (see flow sheet as applicable)       Details if applicable:     50  [facility] Totals Reminder: bill using total billable min of TIMED therapeutic procedures (example: do not include dry needle or estim unattended, both untimed codes, in totals to left)  8-22 min = 1 unit; 23-37 min = 2 units; 38-52 min = 3 units; 53-67 min = 4 units; 68-82 min = 5 units   Total Total     [x]  Patient Education billed concurrently with other procedures   [x] Review HEP    [] Progressed/Changed HEP, detail:

## 2024-02-29 ENCOUNTER — HOSPITAL ENCOUNTER (OUTPATIENT)
Facility: HOSPITAL | Age: 49
Setting detail: RECURRING SERIES
End: 2024-02-29
Payer: COMMERCIAL

## 2024-02-29 PROCEDURE — 97140 MANUAL THERAPY 1/> REGIONS: CPT

## 2024-02-29 PROCEDURE — 97032 APPL MODALITY 1+ESTIM EA 15: CPT

## 2024-02-29 PROCEDURE — 97535 SELF CARE MNGMENT TRAINING: CPT

## 2024-02-29 NOTE — THERAPY RECERTIFICATION
[facility] P[facility]  [address], [address], [place] [address] Ph: [phone] Fx: [phone]  PHYSICAL THERAPY PROGRESS NOTE  Patient Name: Paul Bella : 1975   Treatment/Medical Diagnosis: Cervicalgia [M54.2]  Pain in left shoulder [M25.512]   Referral Source: Femi Amaya MD     Date of Initial Visit: 2024  Attended Visits: 8 Missed Visits: 0     SUMMARY OF TREATMENT    Pt seen in clinic for to address Cervicalgia [M54.2]  Pain in left shoulder [M25.512]. Pt has been assessed, completed therapeutic exercises, neuromuscular re-ed, therapeutic functional activity, received manual therapy intervention, self-care strategies, HEP techniques and pt ed on condition consistency and follow-through.       CURRENT STATUS    Patient has had good tolerance with all therapeutic interventions and has been reporting improvements with ROM of C/S.  Patient has reported no HA's and an increase AROM of C/S with less pain, decrease difficulty and less tightness.  Patient continues to lack (B) side bending motion and tightness along (L) C/S musculature.  Patient continues to benefit from PT interventions.    GROC: +5 a good deal better            Patient reports average pain: 3/10.     Patient reports ~ 40% reduction of symptoms.      Patient reports ~ 60% overall improvement with all general ADLs     Patient reports sitting tolerance: 30 minutes.     Patient reports ~ 6-7 hours of sleep.     MMT (B) UE strength: 4+/5 flexion, scaption and ER on (L).     C/S AROM:  Rotation: (R) 55, (L) 54  Side bending: (R) 20, (L) 10  Flexion: 50  Extension: 60    Improve FOTO score to >/= 64.            Status at IE:  54  Current Status: 53  Goal Met?  progressing    2.  No/minimal tenderness and/or muscle hypertonicity to palpation.               Status at IE:  increased muscle tension left posterior cervical paraspinals, especially so left UT/levator, increased  fullness left thoracic inlet area--possible elevated first rib and/or scalene muscle hypertonicity; tender with increased tension left MT/Rhomboid area; Increased tension left upper pectoral and pectoralis minor areas.  Current Status: continues to present with TTP.  Goal Met?   progressing    3. Negative for left first rib elevation.               Status at IE:  Observe and palpate fullness left thoracic inlet area.  Current Status: resolved   Goal Met?  yes    4. No radicular symptoms left UE.               Status at IE:  occasional numbness tips of left thumb and index/long fingers and also worse when fatigued; when symptoms are at worst, then he can have some pain into his left triceps area.  Current Status: denies radicular symptoms x 1 week  Goal Met?  progressing    Payor: VARUN / Plan: [facility] / Product Type: *No Product type* /     Non-Medicare, can change goals, can adjust or add frequency duration, no signature required      New Goals to be achieved in __4__ WEEKS  1. Increase cervical AROM for sidebending to >/= 30 deg bilat.               Status at IE:  25 deg right with left neck \"stretch\" sensation, 15 deg left and \"stiff\"    Improve FOTO score to >/= 64.            Status at IE:  54  3.  Increase cervical AROM for bilat rotations to >/= 75 deg.               Status at IE:  71 deg right OK, 62 deg left with left neck discomfort     Frequency / Duration:   Patient to be seen   2   times per week for   4    WEEKS    RECOMMENDATIONS  We would like to continue therapy for progress to goals stated above.  We would like to continue treatment for an additional 2x/wk for 4 wks  Continue per initial Plan of Care.    If you have any questions/comments please contact us directly.  Thank you for allowing us to assist in the care of your patient.    Cedric Lincoln, PTA       2/29/2024       9:59 AM

## 2024-02-29 NOTE — PROGRESS NOTES
PHYSICAL / OCCUPATIONAL THERAPY - DAILY TREATMENT NOTE    Patient Name: Paul Bella    Date: 2024    : 1975  Insurance: Payor: VARUN / [facility]: [facility] / Product Type: *No Product type* /      Patient  verified Yes     Visit #   Current / Total 8 12   Time   In / Out 1140 1220   Pain   In / Out 3 2   Subjective Functional Status/Changes: I think that new procedure was good.  The trap is still really tight and down the lest side of my upper back.  Better motion in my neck.     TREATMENT AREA =  Cervicalgia [M54.2]  Pain in left shoulder [M25.512]    OBJECTIVE  Modalities Rationale:     decrease inflammation, decrease pain, and increase tissue extensibility to improve patient's ability to progress to PLOF and address remaining functional goals.     min [] Estim Unattended, type/location:                                      []  w/ice    []  w/heat   8 min [x] Estim Attended, type/location: US continuous/ estim (L) UT,C/S PRONE.                                   [x]  w/US     []  w/ice    []  w/heat    []  TENS insruct      min []  Mechanical Traction: type/lbs                   []  pro   []  sup   []  int   []  cont    []  before manual    []  after manual    min []  Ultrasound, settings/location:     nd min  unbill [x]  Ice     []  Heat    location/position: (L) C/S    min []  Paraffin,  details:     min []  Vasopneumatic Device, press/temp:     min []  Whirlpool / Fluido:    If using vaso (only need to measure limb vaso being performed on)      pre-treatment girth :       post-treatment girth :       measured at (landmark location) :      min []  Other:    Skin assessment post-treatment:   Intact         Therapeutic Procedures:    Tx Min Billable or 1:1 Min (if diff from Tx Min) Procedure, Rationale, Specifics   10  22765 Manual Therapy (timed):  decrease pain, increase ROM, and increase tissue extensibility to improve patient's ability to progress to PLOF and address remaining functional  goals.  The manual therapy interventions were performed at a separate and distinct time from the therapeutic activities interventions . (see flow sheet as applicable)       Details if applicable:        00574 Therapeutic Exercise (timed):  increase ROM, strength, coordination, balance, and proprioception to improve patient's ability to progress to PLOF and address remaining functional goals. (see flow sheet as applicable)       Details if applicable:        28491 Neuromuscular Re-Education (timed):  improve balance, coordination, kinesthetic sense, posture, core stability and proprioception to improve patient's ability to develop conscious control of individual muscles and awareness of position of extremities in order to progress to PLOF and address remaining functional goals. (see flow sheet as applicable)       Details if applicable:        03881 Therapeutic Activity (timed):  use of dynamic activities replicating functional movements to increase ROM, strength, coordination, balance, and proprioception in order to improve patient's ability to progress to PLOF and address remaining functional goals.  (see flow sheet as applicable)       Details if applicable:     22   15208 Self Care/Home Management (timed):  improve patient knowledge and understanding of pain reducing techniques, positioning, posture/ergonomics, activity modification, and diagnosis/prognosis  to improve patient's ability to progress to PLOF and address remaining functional goals.  (see flow sheet as applicable)       Details if applicable:     40  [facility] Totals Reminder: bill using total billable min of TIMED therapeutic procedures (example: do not include dry needle or estim unattended, both untimed codes, in totals to left)  8-22 min = 1 unit; 23-37 min = 2 units; 38-52 min = 3 units; 53-67 min = 4 units; 68-82 min = 5 units   Total Total     [x]  Patient Education billed concurrently with other procedures   [x] Review HEP    []

## 2024-03-01 ENCOUNTER — APPOINTMENT (OUTPATIENT)
Facility: HOSPITAL | Age: 49
End: 2024-03-01
Payer: COMMERCIAL

## 2024-03-05 ENCOUNTER — HOSPITAL ENCOUNTER (OUTPATIENT)
Facility: HOSPITAL | Age: 49
Setting detail: RECURRING SERIES
Discharge: HOME | End: 2024-03-08
Payer: COMMERCIAL

## 2024-03-05 PROCEDURE — 97110 THERAPEUTIC EXERCISES: CPT

## 2024-03-05 PROCEDURE — 97032 APPL MODALITY 1+ESTIM EA 15: CPT

## 2024-03-05 PROCEDURE — 97112 NEUROMUSCULAR REEDUCATION: CPT

## 2024-03-05 PROCEDURE — 97140 MANUAL THERAPY 1/> REGIONS: CPT

## 2024-03-05 NOTE — PROGRESS NOTES
PHYSICAL / OCCUPATIONAL THERAPY - DAILY TREATMENT NOTE    Patient Name: Paul Bella    Date: 3/5/2024    : 1975  Insurance: Payor: VARUN / Plan: [facility] / Product Type: *No Product type* /      Patient  verified Yes     Visit #   Current / Total 9 20   Time   In / Out 145 245   Pain   In / Out 3 0   Subjective Functional Status/Changes: No real pain but more of a stiffness.     TREATMENT AREA =  Cervicalgia [M54.2]  Pain in left shoulder [M25.512]    OBJECTIVE  Modalities Rationale:     decrease inflammation, decrease pain, and increase tissue extensibility to improve patient's ability to progress to PLOF and address remaining functional goals.     min [] Estim Unattended, type/location:                                      []  w/ice    []  w/heat   8 min [x] Estim Attended, type/location: US continuous/ estim (L) UT,C/S PRONE.                                   [x]  w/US     []  w/ice    []  w/heat    []  TENS insruct      min []  Mechanical Traction: type/lbs                   []  pro   []  sup   []  int   []  cont    []  before manual    []  after manual    min []  Ultrasound, settings/location:     nd min  unbill [x]  Ice     []  Heat    location/position: (L) C/S    min []  Paraffin,  details:     min []  Vasopneumatic Device, press/temp:     min []  Whirlpool / Fluido:    If using vaso (only need to measure limb vaso being performed on)      pre-treatment girth :       post-treatment girth :       measured at (landmark location) :      min []  Other:    Skin assessment post-treatment:   Intact         Therapeutic Procedures:    Tx Min Billable or 1:1 Min (if diff from Tx Min) Procedure, Rationale, Specifics   25  15678 Manual Therapy (timed):  decrease pain, increase ROM, and increase tissue extensibility to improve patient's ability to progress to PLOF and address remaining functional goals.  The manual therapy interventions were performed at a separate and distinct time from the

## 2024-03-13 ENCOUNTER — HOSPITAL ENCOUNTER (OUTPATIENT)
Facility: HOSPITAL | Age: 49
Setting detail: RECURRING SERIES
Discharge: HOME | End: 2024-03-16
Payer: COMMERCIAL

## 2024-03-13 PROCEDURE — 97032 APPL MODALITY 1+ESTIM EA 15: CPT

## 2024-03-13 PROCEDURE — 97110 THERAPEUTIC EXERCISES: CPT

## 2024-03-13 PROCEDURE — 97140 MANUAL THERAPY 1/> REGIONS: CPT

## 2024-03-13 NOTE — PROGRESS NOTES
PHYSICAL / OCCUPATIONAL THERAPY - DAILY TREATMENT NOTE    Patient Name: Paul Bella    Date: 3/13/2024    : 1975  Insurance: Payor: VARUN / [facility]: [facility] / Product Type: *No Product type* /      Patient  verified Yes     Visit #   Current / Total 10 20   Time   In / Out 1100 1150   Pain   In / Out 3 0   Subjective Functional Status/Changes: I feel like I did really good overall with the flying and did have that intense pain that I had before.  Still got stiff but I did do some of the exercises which did help.     TREATMENT AREA =  Cervicalgia [M54.2]  Pain in left shoulder [M25.512]    OBJECTIVE  Modalities Rationale:     decrease inflammation, decrease pain, and increase tissue extensibility to improve patient's ability to progress to PLOF and address remaining functional goals.     min [] Estim Unattended, type/location:                                      []  w/ice    []  w/heat   8 min [x] Estim Attended, type/location: US continuous/ estim (L) UT,C/S PRONE.                                   [x]  w/US     []  w/ice    []  w/heat    []  TENS insruct      min []  Mechanical Traction: type/lbs                   []  pro   []  sup   []  int   []  cont    []  before manual    []  after manual    min []  Ultrasound, settings/location:     nd min  unbill [x]  Ice     []  Heat    location/position: (L) C/S    min []  Paraffin,  details:     min []  Vasopneumatic Device, press/temp:     min []  Whirlpool / Fluido:    If using vaso (only need to measure limb vaso being performed on)      pre-treatment girth :       post-treatment girth :       measured at (landmark location) :      min []  Other:    Skin assessment post-treatment:   Intact         Therapeutic Procedures:    Tx Min Billable or 1:1 Min (if diff from Tx Min) Procedure, Rationale, Specifics   12 11426 Manual Therapy (timed):  decrease pain, increase ROM, and increase tissue extensibility to improve patient's ability to progress to PLOF

## 2024-03-15 ENCOUNTER — HOSPITAL ENCOUNTER (OUTPATIENT)
Facility: HOSPITAL | Age: 49
Setting detail: RECURRING SERIES
Discharge: HOME | End: 2024-03-18
Payer: COMMERCIAL

## 2024-03-15 PROCEDURE — 97110 THERAPEUTIC EXERCISES: CPT

## 2024-03-15 PROCEDURE — 97032 APPL MODALITY 1+ESTIM EA 15: CPT

## 2024-03-15 PROCEDURE — 97140 MANUAL THERAPY 1/> REGIONS: CPT

## 2024-03-15 NOTE — PROGRESS NOTES
PHYSICAL / OCCUPATIONAL THERAPY - DAILY TREATMENT NOTE    Patient Name: Paul Bella    Date: 3/15/2024    : 1975  Insurance: Payor: VARUN / [facility]: [facility] / Product Type: *No Product type* /      Patient  verified Yes     Visit #   Current / Total 11 20   Time   In / Out 1025 1115   Pain   In / Out 4 0   Subjective Functional Status/Changes: Did some yard work yesterday for about an hour and my pain was pretty high 4/10.     TREATMENT AREA =  Cervicalgia [M54.2]  Pain in left shoulder [M25.512]    OBJECTIVE  Modalities Rationale:     decrease inflammation, decrease pain, and increase tissue extensibility to improve patient's ability to progress to PLOF and address remaining functional goals.     min [] Estim Unattended, type/location:                                      []  w/ice    []  w/heat   8 min [x] Estim Attended, type/location: US continuous/ estim (L) UT,C/S PRONE.                                   [x]  w/US     []  w/ice    []  w/heat    []  TENS insruct      min []  Mechanical Traction: type/lbs                   []  pro   []  sup   []  int   []  cont    []  before manual    []  after manual    min []  Ultrasound, settings/location:     nd min  unbill [x]  Ice     []  Heat    location/position: (L) C/S    min []  Paraffin,  details:     min []  Vasopneumatic Device, press/temp:     min []  Whirlpool / Fluido:    If using vaso (only need to measure limb vaso being performed on)      pre-treatment girth :       post-treatment girth :       measured at (landmark location) :      min []  Other:    Skin assessment post-treatment:   Intact         Therapeutic Procedures:    Tx Min Billable or 1:1 Min (if diff from Tx Min) Procedure, Rationale, Specifics   06 [id number] Manual Therapy (timed):  decrease pain, increase ROM, and increase tissue extensibility to improve patient's ability to progress to PLOF and address remaining functional goals.  The manual therapy interventions were performed at

## 2024-03-27 ENCOUNTER — HOSPITAL ENCOUNTER (OUTPATIENT)
Facility: HOSPITAL | Age: 49
Setting detail: RECURRING SERIES
Discharge: HOME | End: 2024-03-30
Payer: COMMERCIAL

## 2024-03-27 PROCEDURE — 97140 MANUAL THERAPY 1/> REGIONS: CPT

## 2024-03-27 PROCEDURE — 97110 THERAPEUTIC EXERCISES: CPT

## 2024-03-27 PROCEDURE — 97032 APPL MODALITY 1+ESTIM EA 15: CPT

## 2024-03-27 PROCEDURE — 97112 NEUROMUSCULAR REEDUCATION: CPT

## 2024-03-27 NOTE — PROGRESS NOTES
PHYSICAL / OCCUPATIONAL THERAPY - DAILY TREATMENT NOTE    Patient Name: Paul Bella    Date: 3/27/2024    : 1975  Insurance: Payor: VARUN / [facility]: [facility] / Product Type: *No Product type* /      Patient  verified Yes     Visit #   Current / Total 12 20   Time   In / Out 1100 1200   Pain   In / Out 5 4   Subjective Functional Status/Changes: I think I can pin point now where the pain is coming from, it's not really the muscles but more along my spine     TREATMENT AREA =  Cervicalgia [M54.2]  Pain in left shoulder [M25.512]    OBJECTIVE  Modalities Rationale:     decrease inflammation, decrease pain, and increase tissue extensibility to improve patient's ability to progress to PLOF and address remaining functional goals.     min [] Estim Unattended, type/location:                                      []  w/ice    []  w/heat   8 min [x] Estim Attended, type/location: US continuous/ estim (L) UT,C/S PRONE.                                   [x]  w/US     []  w/ice    []  w/heat    []  TENS insruct      min []  Mechanical Traction: type/lbs                   []  pro   []  sup   []  int   []  cont    []  before manual    []  after manual    min []  Ultrasound, settings/location:      min  unbill [x]  Ice     []  Heat    location/position:     min []  Paraffin,  details:     min []  Vasopneumatic Device, press/temp:     min []  Whirlpool / Fluido:    If using vaso (only need to measure limb vaso being performed on)      pre-treatment girth :       post-treatment girth :       measured at (landmark location) :      min []  Other:    Skin assessment post-treatment:   Intact         Therapeutic Procedures:    Tx Min Billable or 1:1 Min (if diff from Tx Min) Procedure, Rationale, Specifics   25  36626 Manual Therapy (timed):  decrease pain, increase ROM, and increase tissue extensibility to improve patient's ability to progress to PLOF and address remaining functional goals.  The manual therapy

## 2024-03-29 ENCOUNTER — HOSPITAL ENCOUNTER (OUTPATIENT)
Facility: HOSPITAL | Age: 49
Setting detail: RECURRING SERIES
Discharge: HOME | End: 2024-04-01
Payer: COMMERCIAL

## 2024-03-29 PROCEDURE — 20561 NDL INSJ W/O NJX 3+ MUSC: CPT

## 2024-03-29 PROCEDURE — 97140 MANUAL THERAPY 1/> REGIONS: CPT

## 2024-03-29 PROCEDURE — 97112 NEUROMUSCULAR REEDUCATION: CPT

## 2024-04-02 ENCOUNTER — HOSPITAL ENCOUNTER (OUTPATIENT)
Facility: HOSPITAL | Age: 49
Setting detail: RECURRING SERIES
Discharge: HOME | End: 2024-04-05
Payer: COMMERCIAL

## 2024-04-02 PROCEDURE — 97110 THERAPEUTIC EXERCISES: CPT

## 2024-04-02 PROCEDURE — 97140 MANUAL THERAPY 1/> REGIONS: CPT

## 2024-04-02 PROCEDURE — 97032 APPL MODALITY 1+ESTIM EA 15: CPT

## 2024-04-02 NOTE — PROGRESS NOTES
performed at a separate and distinct time from the therapeutic activities interventions . (see flow sheet as applicable)       Details if applicable:     22   54364 Therapeutic Exercise (timed):  increase ROM, strength, coordination, balance, and proprioception to improve patient's ability to progress to PLOF and address remaining functional goals. (see flow sheet as applicable)       Details if applicable:        69236 Neuromuscular Re-Education (timed):  improve balance, coordination, kinesthetic sense, posture, core stability and proprioception to improve patient's ability to develop conscious control of individual muscles and awareness of position of extremities in order to progress to PLOF and address remaining functional goals. (see flow sheet as applicable)       Details if applicable:        29502 Therapeutic Activity (timed):  use of dynamic activities replicating functional movements to increase ROM, strength, coordination, balance, and proprioception in order to improve patient's ability to progress to PLOF and address remaining functional goals.  (see flow sheet as applicable)       Details if applicable:        47819 Self Care/Home Management (timed):  improve patient knowledge and understanding of pain reducing techniques, positioning, posture/ergonomics, activity modification, and diagnosis/prognosis  to improve patient's ability to progress to PLOF and address remaining functional goals.  (see flow sheet as applicable)       Details if applicable:     60  [facility] Totals Reminder: bill using total billable min of TIMED therapeutic procedures (example: do not include dry needle or estim unattended, both untimed codes, in totals to left)  8-22 min = 1 unit; 23-37 min = 2 units; 38-52 min = 3 units; 53-67 min = 4 units; 68-82 min = 5 units   Total Total     [x]  Patient Education billed concurrently with other procedures   [x] Review HEP    [] Progressed/Changed HEP, detail:    [] Other detail:

## 2024-04-02 NOTE — THERAPY RECERTIFICATION
[facility]  [address], [address], [place] [address] Ph: [phone] Fx: [phone]  PHYSICAL THERAPY PROGRESS NOTE  Patient Name: Paul Bella : 1975   Treatment/Medical Diagnosis: Cervicalgia [M54.2]  Pain in left shoulder [M25.512]   Referral Source: Femi Amaya MD     Date of Initial Visit: 24 Attended Visits: 14 Missed Visits: 0     SUMMARY OF TREATMENT   Patient is a 48 year old Right handed male referred to PT by his doctor (2024) for Cervical Pain, Left UE Radiculopathy. Pt seen in clinic for to address Cervicalgia [M54.2]  Pain in left shoulder [M25.512]. Pt has been assessed, completed therapeutic exercises, neuromuscular re-ed, therapeutic functional activity, received manual therapy intervention, self-care strategies, HEP techniques and pt ed on condition consistency and follow-through.     CURRENT STATUS  Patient has been responding favorably to all therapeutic interventions and reports improvements with performance of all general ADLs, ROM of neck and reduction of overall symptoms.  Patient has had one session of dry needling and reports good response.  Patient has a decrease of tension of overall C/S musculature through (B) UT regions.  Primary c/o for patient is continues \"stiffness\" with AROM of C/S.    GROC: +5 good deal better     FOTO: 60     C/S AROM:  Flexion: 45  Extension: 60     Patient reports ~ 30% reduction of symptoms.      Patient reports ~ 70% overall improvement with all general ADLs    Increase cervical AROM for sidebending to >/= 30 deg bilat.               Status at IE:  25 deg right with left neck \"stretch\" sensation, 15 deg left and \"stiff\"   Current Status: Side bending: (R) 20 , (L) 20  Goal Met?   progressing    2.  Improve FOTO score to >/= 64.            Status at IE:  54  Current Status: FOTO: 60  Goal Met?   progressing    3. Increase cervical AROM for bilat rotations to >/= 75

## 2024-04-04 ENCOUNTER — APPOINTMENT (OUTPATIENT)
Facility: HOSPITAL | Age: 49
End: 2024-04-04
Payer: COMMERCIAL

## 2024-04-08 ENCOUNTER — HOSPITAL ENCOUNTER (OUTPATIENT)
Facility: HOSPITAL | Age: 49
Setting detail: RECURRING SERIES
Discharge: HOME | End: 2024-04-11
Payer: COMMERCIAL

## 2024-04-08 PROCEDURE — 97140 MANUAL THERAPY 1/> REGIONS: CPT

## 2024-04-08 PROCEDURE — 97112 NEUROMUSCULAR REEDUCATION: CPT

## 2024-04-08 PROCEDURE — 20560 NDL INSJ W/O NJX 1 OR 2 MUSC: CPT

## 2024-04-08 NOTE — PROGRESS NOTES
PHYSICAL / OCCUPATIONAL THERAPY - DAILY TREATMENT NOTE    Patient Name: Paul Bella    Date: 2024    : 1975  Insurance: Payor: VARUN / [facility]: [facility] / Product Type: *No Product type* /      Patient  verified Yes     Visit #   Current / Total 15 20   Time   In / Out 945 1045   Pain   In / Out 3 0   Subjective Functional Status/Changes: Pt reports c/s MRI is scheduled for Wednesday night 4/10/24     TREATMENT AREA =  Cervicalgia [M54.2]  Pain in left shoulder [M25.512]    OBJECTIVE  Modalities Rationale:     decrease inflammation, decrease pain, and increase tissue extensibility to improve patient's ability to progress to PLOF and address remaining functional goals.     min [] Estim Unattended, type/location:                                      []  w/ice    []  w/heat    min [x] Estim Attended, type/location: US continuous/ estim (L) UT,C/S PRONE.                                   [x]  w/US     []  w/ice    []  w/heat    []  TENS insruct      min []  Mechanical Traction: type/lbs                   []  pro   []  sup   []  int   []  cont    []  before manual    []  after manual    min []  Ultrasound, settings/location:     10 min  unbill []  Ice     [x]  Heat    location/position: L shoulder in supine    min []  Paraffin,  details:     min []  Vasopneumatic Device, press/temp:     min []  Whirlpool / Fluido:    If using vaso (only need to measure limb vaso being performed on)      pre-treatment girth :       post-treatment girth :       measured at (landmark location) :      min []  Other:    Skin assessment post-treatment:   Intact         Therapeutic Procedures:    Tx Min Billable or 1:1 Min (if diff from Tx Min) Procedure, Rationale, Specifics   8 8 [id number] Manual Therapy (timed):  decrease pain, increase ROM, and increase tissue extensibility to improve patient's ability to progress to PLOF and address remaining functional goals.  The manual therapy interventions were performed at a separate

## 2024-04-11 ENCOUNTER — HOSPITAL ENCOUNTER (OUTPATIENT)
Facility: HOSPITAL | Age: 49
Setting detail: RECURRING SERIES
Discharge: HOME | End: 2024-04-14
Payer: COMMERCIAL

## 2024-04-11 PROCEDURE — 97535 SELF CARE MNGMENT TRAINING: CPT

## 2024-04-11 PROCEDURE — 97140 MANUAL THERAPY 1/> REGIONS: CPT

## 2024-04-11 NOTE — PROGRESS NOTES
PHYSICAL / OCCUPATIONAL THERAPY - DAILY TREATMENT NOTE    Patient Name: Paul Bella    Date: 2024    : 1975  Insurance: Payor: VARUN / [facility]: [facility] / Product Type: *No Product type* /      Patient  verified Yes     Visit #   Current / Total 16 28   Time   In / Out 220 300   Pain   In / Out 1 1   Subjective [id number] Status/Changes: I was really surprised at how much this therapy has helped and how much more I can move my head.     TREATMENT AREA =  Cervicalgia [M54.2]  Pain in left shoulder [M25.512]    OBJECTIVE  Modalities Rationale:     decrease inflammation, decrease pain, and increase tissue extensibility to improve patient's ability to progress to PLOF and address remaining functional goals.     min [] Estim Unattended, type/location:                                      []  w/ice    []  w/heat   8 min [x] Estim Attended, type/location: US continuous/ estim (L) UT,C/S PRONE.                                   [x]  w/US     []  w/ice    []  w/heat    []  TENS insruct      min []  Mechanical Traction: type/lbs                   []  pro   []  sup   []  int   []  cont    []  before manual    []  after manual    min []  Ultrasound, settings/location:      min  unbill [x]  Ice     []  Heat    location/position:     min []  Paraffin,  details:     min []  Vasopneumatic Device, press/temp:     min []  Whirlpool / Fluido:    If using vaso (only need to measure limb vaso being performed on)      pre-treatment girth :       post-treatment girth :       measured at (landmark location) :      min []  Other:    Skin assessment post-treatment:   Intact         Therapeutic Procedures:    Tx Min Billable or 1:1 Min (if diff from Tx Min) Procedure, Rationale, Specifics   15  39370 Manual Therapy (timed):  decrease pain, increase ROM, and increase tissue extensibility to improve patient's ability to progress to PLOF and address remaining functional goals.  The manual therapy interventions were

## 2024-05-10 ENCOUNTER — HOSPITAL ENCOUNTER (OUTPATIENT)
Facility: HOSPITAL | Age: 49
Setting detail: RECURRING SERIES
Discharge: HOME | End: 2024-05-13
Payer: COMMERCIAL

## 2024-05-10 PROCEDURE — 97162 PT EVAL MOD COMPLEX 30 MIN: CPT

## 2024-05-10 PROCEDURE — 97110 THERAPEUTIC EXERCISES: CPT

## 2024-05-10 PROCEDURE — 97530 THERAPEUTIC ACTIVITIES: CPT

## 2024-05-10 NOTE — THERAPY EVALUATION
PHYSICAL / OCCUPATIONAL THERAPY - DAILY TREATMENT NOTE (updated )  For Eval visit    Patient Name: Paul Bella    Date: 5/10/2024    : 1975  Insurance: Payor: VARUN / [facility]: [facility] / Product Type: *No Product type* /      Patient  verified yes     Visit #   Current / Total 1 8-12   Time   In / Out 1100 1150   Pain   In / Out 4 0   Subjective Functional Status/Changes: See POC     TREATMENT AREA =  Cervicalgia [M54.2]  Pain in left shoulder [M25.512]    OBJECTIVE    Modalities Rationale:     decrease inflammation and decrease pain to improve patient's ability to progress to PLOF and address remaining functional goals.     min [] Estim Unattended, type/location:                                      []  w/ice    []  w/heat    min [] Estim Attended, type/location:                                     []  w/US     []  w/ice    []  w/heat    []  TENS insruct      min []  Mechanical Traction: type/lbs                   []  pro   []  sup   []  int   []  cont    []  before manual    []  after manual    min []  Ultrasound, settings/location:     10 min  unbill [x]  Ice     []  Heat    location/position: Supine to T/S and C/S    min []  Paraffin,  details:     min []  Vasopneumatic Device, press/temp:     min []  Whirlpool / Fluido:    If using vaso (only need to measure limb vaso being performed on)      pre-treatment girth :       post-treatment girth :       measured at (landmark location) :      min []  Other:    Skin assessment post-treatment:   Intact        15 min   Eval - untimed                      Therapeutic Procedures:  Tx Min Billable or 1:1 Min (if diff from Tx Min) Procedure, Rationale, Specifics   15  23026 Therapeutic Exercise (timed):  increase ROM, strength, coordination, balance, and proprioception to improve patient's ability to progress to PLOF and address remaining functional goals. (see flow sheet as applicable)     Details if applicable:     10  24375 Therapeutic Activity

## 2024-05-10 NOTE — THERAPY EVALUATION
[facility]  [address], [address], [place] [address] Ph: [phone] Fx: [phone]  Plan of Care / Statement of Necessity for Physical Therapy Services     Patient Name: Paul Bella : 1975   Medical   Diagnosis: Cervicalgia [M54.2]  Pain in left shoulder [M25.512] Treatment Diagnosis:  M25.512  LEFT SHOULDER PAIN and M54.2, G89.29  CHRONIC NECK PAIN    Onset Date: 2023     Referral Source: Femi Amaya MD Start of Care (SOC): 5/10/2024   Prior Hospitalization: See medical history Provider #: [id number]   Prior Level of Function: Pain with ADL's and work related activities   Comorbidities: Back pain     Assessment / key information:  Patient is a 48 y.o. male who presents to [facility] with Dx of Neck pain and left shoulder pain.  Patient is a  and reports initial onset of sx began in , worsening of symptoms over . Patient was recently seen in clinic and reports he has made some improvements since the initial incident. He reports he has had recent imaging of the cervical spine but does not have the results. C/o increasing weakness into the bicep w/ increased pain and swelling in the elbow along with radicular symptoms into the median nerve distribution of the left hand. He also described his pain as stiffness and dull ache in C7/C8, left UT and levator with worsening of sx with fatigue, left rotation and left rotation w/ extension.  Sx improve with rest, medication and dry needling/massage, hot showers.  Average reported pain level at 4/10, 8/10 at worst & 2/10 at best.  Occasional radicular symptoms to median nerve distribution.  Upon objective evaluation patient demonstrates:  Posture: [] WNL  Head Position: forward  Shoulder/Scapular Position: rounded shoulders bilaterally, winged scapula bilaterally  C-Kyphosis:  [] increased   [] decreased   C-Lordosis:   [x] increased

## 2024-05-14 ENCOUNTER — HOSPITAL ENCOUNTER (OUTPATIENT)
Facility: HOSPITAL | Age: 49
Setting detail: RECURRING SERIES
End: 2024-05-14
Payer: COMMERCIAL

## 2024-05-14 NOTE — THERAPY DISCHARGE
[facility]  [address], [place] [address] Ph: [phone] Fx: [phone]  DISCHARGE SUMMARY  Patient Name: Paul Bella : 1975   Treatment/Medical Diagnosis: Cervicalgia [M54.2]  Pain in left shoulder [M25.512]   Referral Source: Femi Amaya MD     Date of Initial Visit: 5/10/24 Attended Visits: 1 Missed Visits: 0     SUMMARY OF TREATMENT    Pt attended only initial evaluation and     0     follow-ups.  Patient had informed front office that he was having dry needling at another facility and that he was also having exercises performed there as well.  Patient unable to be seen by two different facilities and has been discharged at this time.      non-Medicare    RECOMMENDATIONS  Discontinue therapy due to lack of attendance or compliance.    If you have any questions/comments please contact us directly at [phone].   Thank you for allowing us to assist in the care of your patient.    Cedric Lincoln, PTA       2024       1:44 PM    Not Medicaid Ins, no signature required for DC

## 2024-05-16 ENCOUNTER — APPOINTMENT (OUTPATIENT)
Facility: HOSPITAL | Age: 49
End: 2024-05-16
Payer: COMMERCIAL

## 2024-05-21 ENCOUNTER — APPOINTMENT (OUTPATIENT)
Facility: HOSPITAL | Age: 49
End: 2024-05-21
Payer: COMMERCIAL

## 2024-05-23 ENCOUNTER — APPOINTMENT (OUTPATIENT)
Facility: HOSPITAL | Age: 49
End: 2024-05-23
Payer: COMMERCIAL